# Patient Record
Sex: MALE | Race: WHITE | NOT HISPANIC OR LATINO | Employment: OTHER | URBAN - METROPOLITAN AREA
[De-identification: names, ages, dates, MRNs, and addresses within clinical notes are randomized per-mention and may not be internally consistent; named-entity substitution may affect disease eponyms.]

---

## 2020-02-06 ENCOUNTER — OFFICE VISIT (OUTPATIENT)
Dept: MEDICAL GROUP | Facility: MEDICAL CENTER | Age: 60
End: 2020-02-06
Payer: COMMERCIAL

## 2020-02-06 VITALS
WEIGHT: 248 LBS | BODY MASS INDEX: 35.5 KG/M2 | RESPIRATION RATE: 16 BRPM | SYSTOLIC BLOOD PRESSURE: 130 MMHG | TEMPERATURE: 98 F | HEIGHT: 70 IN | OXYGEN SATURATION: 94 % | DIASTOLIC BLOOD PRESSURE: 84 MMHG | HEART RATE: 77 BPM

## 2020-02-06 DIAGNOSIS — Z96.642 S/P HIP REPLACEMENT, LEFT: ICD-10-CM

## 2020-02-06 DIAGNOSIS — E66.9 OBESITY, CLASS II, BMI 35-39.9, ISOLATED (SEE ACTUAL BMI): ICD-10-CM

## 2020-02-06 DIAGNOSIS — Z12.11 SCREENING FOR COLON CANCER: ICD-10-CM

## 2020-02-06 DIAGNOSIS — Z12.5 SCREENING PSA (PROSTATE SPECIFIC ANTIGEN): ICD-10-CM

## 2020-02-06 DIAGNOSIS — Z13.220 LIPID SCREENING: ICD-10-CM

## 2020-02-06 DIAGNOSIS — R01.1 CARDIAC MURMUR: ICD-10-CM

## 2020-02-06 DIAGNOSIS — M16.11 PRIMARY OSTEOARTHRITIS OF RIGHT HIP: ICD-10-CM

## 2020-02-06 DIAGNOSIS — Z00.00 ANNUAL PHYSICAL EXAM: ICD-10-CM

## 2020-02-06 DIAGNOSIS — Z76.89 ENCOUNTER TO ESTABLISH CARE: ICD-10-CM

## 2020-02-06 PROBLEM — E66.812 OBESITY, CLASS II, BMI 35-39.9, ISOLATED (SEE ACTUAL BMI): Status: ACTIVE | Noted: 2020-02-06

## 2020-02-06 PROCEDURE — 99204 OFFICE O/P NEW MOD 45 MIN: CPT | Performed by: NURSE PRACTITIONER

## 2020-02-06 ASSESSMENT — PATIENT HEALTH QUESTIONNAIRE - PHQ9: CLINICAL INTERPRETATION OF PHQ2 SCORE: 0

## 2020-02-06 NOTE — ASSESSMENT & PLAN NOTE
Murmur noted on exam.  Patient is not aware of any history of murmur, states that he is never had any heart problems.  Reports that he is very active, frequently goes hunting and hikes up intense inclines without any difficulty.  Denies any shortness of breath or chest pain, dizziness, activity intolerance

## 2020-02-06 NOTE — ASSESSMENT & PLAN NOTE
History of left hip replacement several years ago, recovered well.  Now having more difficulty with the right hip and likely planning for replacement in the next few months, seen by Dr. Maya

## 2020-02-06 NOTE — PROGRESS NOTES
Chief Complaint   Patient presents with   • Establish Care   • Referral Needed     colonoscopy      Smith Gloria is a 59 y.o. male here to establish care, his wife Negrita is a patient of mine.  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  He has not seen primary care in many years, reports that he would like to get lab work and referral for colonoscopy.    Cardiac murmur  Murmur noted on exam.  Patient is not aware of any history of murmur, states that he is never had any heart problems.  Reports that he is very active, frequently goes hunting and hikes up intense inclines without any difficulty.  Denies any shortness of breath or chest pain, dizziness, activity intolerance    Obesity, Class II, BMI 35-39.9, isolated (see actual BMI)  Gradual weight gain over the last several years.  States that he is eating fairly healthy, typically has 2 meals a day drinks 1-2 alcoholic beverages daily.  He is very active but not doing any planned cardio exercise.  He does have family history of diabetes    Osteoarthritis of right hip  History of left hip replacement several years ago, recovered well.  Now having more difficulty with the right hip and likely planning for replacement in the next few months, seen by Dr. Maya    Current medicines (including changes today)  No current outpatient medications on file.     No current facility-administered medications for this visit.      He  has no past medical history on file.  He  has a past surgical history that includes hip replacement, total (2006) and cataract extraction with iol (Bilateral).  Social History     Tobacco Use   • Smoking status: Never Smoker   Substance Use Topics   • Alcohol use: Yes     Alcohol/week: 3.0 oz     Types: 6 Cans of beer per week   • Drug use: No     Social History     Patient does not qualify to have social determinant information on file (likely too young).   Social History Narrative   • Not on file     Family History   Problem Relation Age  "of Onset   • Stroke Mother         65 y.o.   • Cancer Mother         Breast   • Diabetes Father    • Heart Disease Father 81        stents x 6   • Psychiatric Illness Brother    • Alcohol abuse Brother    • Drug abuse Brother      Family Status   Relation Name Status   • Mo  Alive   • Fa  Alive   • Sis  Alive   • Bro  Alive   • MGMo     • MGFa     • PGMo     • PGFa     • Bro  Alive   • Bro  Alive   • Bro           ROS  Problems listed discussed above, all other systems reviewed and negative     Objective:     /84 (BP Location: Left arm, Patient Position: Sitting, BP Cuff Size: Adult)   Pulse 77   Temp 36.7 °C (98 °F) (Temporal)   Resp 16   Ht 1.778 m (5' 10\")   Wt 112.5 kg (248 lb)   SpO2 94%  Body mass index is 35.58 kg/m².  Physical Exam:  General: Alert, oriented in no acute distress.  Eye contact is good, speech is normal, affect calm  HEENT: perrl, Oral mucosa pink moist, no lesions. Nares patent. TMs gray with good landmarks bilaterally. No cervical or supraclavicular lymphadenopathy  Lungs: clear to auscultation bilaterally, good aeration, normal effort. No wheeze/ rhonchi/ rales.  CV: regular rate and rhythm, S1, S2.  3 out of 6 systolic murmur heard best at the right sternal border, no edema.   Abdomen: soft, nontender, BS x4, central obesity.  Ext: color normal, vascularity normal, temperature normal. No rash or lesions.  Neuro: DTR 2+ bilaterally   Assessment and Plan:   The following treatment plan was discussed   1. Annual physical exam  Normal physical exam except as discussed below. General health and wellness discussion including healthy diet, regular exercise. Advised regular dental cleanings, eye exam yearly.    Lipid Profile    Comp Metabolic Panel   2. Primary osteoarthritis of right hip  Planning for right hip replacement   3. S/P hip replacement, left  Recovered well   4. Obesity, Class II, BMI 35-39.9, isolated (see actual BMI)  Patient " identified as having weight management issue.  Appropriate orders and counseling given.   5. Cardiac murmur  Pt denies h/o murmur. States he is very active and has no shortness of breath or dizziness with this, no chest pain.   6. Encounter to establish care     7. Lipid screening  Lipid Profile   8. Screening PSA (prostate specific antigen)  PROSTATE SPECIFIC AG SCREENING   9. Screening for colon cancer  Referral for counseling       Educated in proper administration of medication(s) ordered today including safety, possible SE, risks, benefits, rationale and alternatives to therapy.     Followup: pending labs             Please note that this dictation was created using voice recognition software. I have worked with consultants from the vendor as well as technical experts from [facility] [employer] to optimize the interface. I have made every reasonable attempt to correct obvious errors, but I expect that there are errors of grammar and possibly content that I did not discover before finalizing the note.

## 2020-02-06 NOTE — ASSESSMENT & PLAN NOTE
Gradual weight gain over the last several years.  States that he is eating fairly healthy, typically has 2 meals a day drinks 1-2 alcoholic beverages daily.  He is very active but not doing any planned cardio exercise.  He does have family history of diabetes

## 2020-02-29 LAB
ALBUMIN SERPL-MCNC: 4.4 G/DL (ref 3.8–4.9)
ALBUMIN/GLOB SERPL: 1.6 {RATIO} (ref 1.2–2.2)
ALP SERPL-CCNC: 81 IU/L (ref 39–117)
ALT SERPL-CCNC: 26 IU/L (ref 0–44)
AST SERPL-CCNC: 18 IU/L (ref 0–40)
BILIRUB SERPL-MCNC: 0.7 MG/DL (ref 0–1.2)
BUN SERPL-MCNC: 12 MG/DL (ref 6–24)
BUN/CREAT SERPL: 13 (ref 9–20)
CALCIUM SERPL-MCNC: 9.7 MG/DL (ref 8.7–10.2)
CHLORIDE SERPL-SCNC: 97 MMOL/L (ref 96–106)
CHOLEST SERPL-MCNC: 198 MG/DL (ref 100–199)
CO2 SERPL-SCNC: 25 MMOL/L (ref 20–29)
CREAT SERPL-MCNC: 0.91 MG/DL (ref 0.76–1.27)
GLOBULIN SER CALC-MCNC: 2.8 G/DL (ref 1.5–4.5)
GLUCOSE SERPL-MCNC: 108 MG/DL (ref 65–99)
HDLC SERPL-MCNC: 57 MG/DL
LABORATORY COMMENT REPORT: ABNORMAL
LDLC SERPL CALC-MCNC: 124 MG/DL (ref 0–99)
POTASSIUM SERPL-SCNC: 4.4 MMOL/L (ref 3.5–5.2)
PROT SERPL-MCNC: 7.2 G/DL (ref 6–8.5)
PSA SERPL-MCNC: 0.8 NG/ML (ref 0–4)
SODIUM SERPL-SCNC: 139 MMOL/L (ref 134–144)
TRIGL SERPL-MCNC: 86 MG/DL (ref 0–149)
VLDLC SERPL CALC-MCNC: 17 MG/DL (ref 5–40)

## 2020-03-11 ENCOUNTER — OFFICE VISIT (OUTPATIENT)
Dept: MEDICAL GROUP | Facility: MEDICAL CENTER | Age: 60
End: 2020-03-11
Payer: COMMERCIAL

## 2020-03-11 VITALS
WEIGHT: 239 LBS | SYSTOLIC BLOOD PRESSURE: 138 MMHG | OXYGEN SATURATION: 96 % | DIASTOLIC BLOOD PRESSURE: 100 MMHG | TEMPERATURE: 97.2 F | HEART RATE: 76 BPM | BODY MASS INDEX: 34.22 KG/M2 | RESPIRATION RATE: 16 BRPM | HEIGHT: 70 IN

## 2020-03-11 DIAGNOSIS — Z11.59 NEED FOR HEPATITIS C SCREENING TEST: ICD-10-CM

## 2020-03-11 DIAGNOSIS — B35.1 ONYCHOMYCOSIS: ICD-10-CM

## 2020-03-11 PROCEDURE — 99213 OFFICE O/P EST LOW 20 MIN: CPT | Performed by: NURSE PRACTITIONER

## 2020-03-11 RX ORDER — TERBINAFINE HYDROCHLORIDE 250 MG/1
250 TABLET ORAL DAILY
Qty: 30 TAB | Refills: 3 | Status: SHIPPED | OUTPATIENT
Start: 2020-03-11 | End: 2020-08-27

## 2020-03-11 NOTE — ASSESSMENT & PLAN NOTE
Patient reports longstanding difficulty with fungal nail infection affecting bilateral large toes, ongoing for more than 15 years.  States that he is tried multiple homeopathic and over-the-counter treatment options without improvement, he would like to try oral medication.  He has had recent labs which showed normal liver function.  He drinks alcohol only on occasion, typically 1 drink at a time.  He does not have any skin involvement, erythema, associated pain

## 2020-03-11 NOTE — PROGRESS NOTES
"Subjective:     Chief Complaint   Patient presents with   • Nail Problem     Smith Gloria is a 59 y.o. male here today to follow up on:    Onychomycosis  Patient reports longstanding difficulty with fungal nail infection affecting bilateral large toes, ongoing for more than 15 years.  States that he is tried multiple homeopathic and over-the-counter treatment options without improvement, he would like to try oral medication.  He has had recent labs which showed normal liver function.  He drinks alcohol only on occasion, typically 1 drink at a time.  He does not have any skin involvement, erythema, associated pain       Current medicines (including changes today)  Current Outpatient Medications   Medication Sig Dispense Refill   • terbinafine (LAMISIL) 250 MG Tab Take 1 Tab by mouth every day. 30 Tab 3     No current facility-administered medications for this visit.      He  has no past medical history on file.    ROS included above     Objective:     /100 (BP Location: Left arm, Patient Position: Sitting, BP Cuff Size: Adult)   Pulse 76   Temp 36.2 °C (97.2 °F) (Temporal)   Resp 16   Ht 1.778 m (5' 10\")   Wt 108.4 kg (239 lb)   SpO2 96%  Body mass index is 34.29 kg/m².     Physical Exam:  General: Alert, oriented in no acute distress.  Eye contact is good, speech is normal, affect calm  Lungs: clear to auscultation bilaterally, normal effort, no wheeze/ rhonchi/ rales.  CV: regular rate and rhythm, S1, S2, no murmur  Ext: Bilateral large toenail thickened and yellowed, other toenails normal.  No skin erythema, no rash    Assessment and Plan:   The following treatment plan was discussed  1. Onychomycosis   patient is failed multiple over-the-counter and homeopathic treatment options, requesting oral medication.  Risks and side effects reviewed, recent labs normal.  Plan for repeat labs in 1 month  terbinafine (LAMISIL) 250 MG Tab    HEPATIC FUNCTION PANEL   2. Need for hepatitis C screening test  HEP C " VIRUS ANTIBODY       Followup:  Labs in 1 month         Please note that this dictation was created using voice recognition software. I have worked with consultants from the vendor as well as technical experts from [facility] to optimize the interface. I have made every reasonable attempt to correct obvious errors, but I expect that there are errors of grammar and possibly content that I did not discover before finalizing the note.

## 2020-07-07 DIAGNOSIS — Z01.812 PRE-OPERATIVE LABORATORY EXAMINATION: ICD-10-CM

## 2020-07-07 DIAGNOSIS — Z01.810 PRE-OPERATIVE CARDIOVASCULAR EXAMINATION: ICD-10-CM

## 2020-07-07 LAB
ANION GAP SERPL CALC-SCNC: 11 MMOL/L (ref 7–16)
BUN SERPL-MCNC: 19 MG/DL (ref 8–22)
CALCIUM SERPL-MCNC: 9.7 MG/DL (ref 8.4–10.2)
CHLORIDE SERPL-SCNC: 103 MMOL/L (ref 96–112)
CO2 SERPL-SCNC: 23 MMOL/L (ref 20–33)
CREAT SERPL-MCNC: 0.8 MG/DL (ref 0.5–1.4)
EKG IMPRESSION: NORMAL
ERYTHROCYTE [DISTWIDTH] IN BLOOD BY AUTOMATED COUNT: 41.1 FL (ref 35.9–50)
GLUCOSE SERPL-MCNC: 105 MG/DL (ref 65–99)
HCT VFR BLD AUTO: 47.5 % (ref 42–52)
HGB BLD-MCNC: 16.1 G/DL (ref 14–18)
MCH RBC QN AUTO: 30.3 PG (ref 27–33)
MCHC RBC AUTO-ENTMCNC: 33.9 G/DL (ref 33.7–35.3)
MCV RBC AUTO: 89.5 FL (ref 81.4–97.8)
PLATELET # BLD AUTO: 252 K/UL (ref 164–446)
PMV BLD AUTO: 10.6 FL (ref 9–12.9)
POTASSIUM SERPL-SCNC: 4.5 MMOL/L (ref 3.6–5.5)
RBC # BLD AUTO: 5.31 M/UL (ref 4.7–6.1)
SODIUM SERPL-SCNC: 137 MMOL/L (ref 135–145)
WBC # BLD AUTO: 7 K/UL (ref 4.8–10.8)

## 2020-07-07 PROCEDURE — 87640 STAPH A DNA AMP PROBE: CPT

## 2020-07-07 PROCEDURE — 85027 COMPLETE CBC AUTOMATED: CPT

## 2020-07-07 PROCEDURE — 93010 ELECTROCARDIOGRAM REPORT: CPT | Performed by: INTERNAL MEDICINE

## 2020-07-07 PROCEDURE — 80048 BASIC METABOLIC PNL TOTAL CA: CPT

## 2020-07-07 PROCEDURE — 93005 ELECTROCARDIOGRAM TRACING: CPT

## 2020-07-07 PROCEDURE — 87641 MR-STAPH DNA AMP PROBE: CPT

## 2020-07-07 PROCEDURE — 36415 COLL VENOUS BLD VENIPUNCTURE: CPT

## 2020-07-07 RX ORDER — IBUPROFEN 200 MG
800 TABLET ORAL EVERY 6 HOURS PRN
COMMUNITY

## 2020-07-07 NOTE — OR NURSING
"Patient enrolled in Bloodless Program, Physician notified of bloodless enrollment, bloodless consent signed, Advanced directive, durable power of  or social consult placed.  \"BLOODLESS\" entered into allergy section, patient had \"NO BLOOD\" armband and stickers attached to chart in preadmit  "

## 2020-07-07 NOTE — DISCHARGE PLANNING
DISCHARGE PLANNING NOTE - TOTAL JOINT     Procedure: Procedure(s):  ARTHROPLASTY, HIP, TOTAL - AND SALDANA  Procedure Date: 7/13/2020  Insurance:  Payor: SAROJR / Plan: AMBETTER / Product Type: *No Product type* /   Equipment currently available at home? crutches  Steps into the home? 1  Steps within the home? 0  Toilet height? Standard  Type of shower? tub-shower  Who will be with you during your recovery? Wife.  Is Outpatient Physical Therapy set up after surgery? Yes  Did you take the Total Joint Class and where? No and pt refusing any educational material due to previous surgery.  Planning on same day discharge? No.     This writer met with pt during his preadmission appointment. Pt declined any educational material due to previous surgery. Pt states he will not need anything except for his crutches. Home safety checklist reviewed and pt did take a copy. No questions asked by pt. Anticipate dc to home without barriers.

## 2020-07-07 NOTE — OR NURSING
"Preadmit appointment: \" Preparing for your Procedure information\" sheet given to patient with verbal and written instructions. Patient instructed to continue prescribed medications through the day before surgery, instructed to take the following medications the day of surgery per anesthesia protocol: none.  Pt denies issues with anesthesia,  Pt denies burning, urgency, frequency or pain with urination. Pt to have covid testing done thurs. 7/9 and is aware to self isolate and given symptoms of covid and to notify MD if any symptoms develop.   "

## 2020-07-08 ENCOUNTER — APPOINTMENT (OUTPATIENT)
Dept: ADMISSIONS | Facility: MEDICAL CENTER | Age: 60
End: 2020-07-08
Attending: ORTHOPAEDIC SURGERY
Payer: COMMERCIAL

## 2020-07-08 LAB
SCCMEC + MECA PNL NOSE NAA+PROBE: NEGATIVE
SCCMEC + MECA PNL NOSE NAA+PROBE: NEGATIVE

## 2020-07-09 ENCOUNTER — OFFICE VISIT (OUTPATIENT)
Dept: ADMISSIONS | Facility: MEDICAL CENTER | Age: 60
End: 2020-07-09
Attending: ORTHOPAEDIC SURGERY
Payer: COMMERCIAL

## 2020-07-09 DIAGNOSIS — Z01.812 PRE-OPERATIVE LABORATORY EXAMINATION: ICD-10-CM

## 2020-07-09 LAB — COVID ORDER STATUS COVID19: NORMAL

## 2020-07-09 PROCEDURE — U0003 INFECTIOUS AGENT DETECTION BY NUCLEIC ACID (DNA OR RNA); SEVERE ACUTE RESPIRATORY SYNDROME CORONAVIRUS 2 (SARS-COV-2) (CORONAVIRUS DISEASE [COVID-19]), AMPLIFIED PROBE TECHNIQUE, MAKING USE OF HIGH THROUGHPUT TECHNOLOGIES AS DESCRIBED BY CMS-2020-01-R: HCPCS

## 2020-07-10 LAB
SARS-COV-2 RNA RESP QL NAA+PROBE: NOTDETECTED
SPECIMEN SOURCE: NORMAL

## 2020-07-13 ENCOUNTER — APPOINTMENT (OUTPATIENT)
Dept: RADIOLOGY | Facility: MEDICAL CENTER | Age: 60
End: 2020-07-13
Attending: ORTHOPAEDIC SURGERY
Payer: COMMERCIAL

## 2020-07-13 ENCOUNTER — HOSPITAL ENCOUNTER (OUTPATIENT)
Facility: MEDICAL CENTER | Age: 60
End: 2020-07-13
Attending: ORTHOPAEDIC SURGERY | Admitting: ORTHOPAEDIC SURGERY
Payer: COMMERCIAL

## 2020-07-13 ENCOUNTER — ANESTHESIA (OUTPATIENT)
Dept: SURGERY | Facility: MEDICAL CENTER | Age: 60
End: 2020-07-13
Payer: COMMERCIAL

## 2020-07-13 ENCOUNTER — ANESTHESIA EVENT (OUTPATIENT)
Dept: SURGERY | Facility: MEDICAL CENTER | Age: 60
End: 2020-07-13
Payer: COMMERCIAL

## 2020-07-13 PROCEDURE — 72170 X-RAY EXAM OF PELVIS: CPT

## 2020-07-13 PROCEDURE — 160036 HCHG PACU - EA ADDL 30 MINS PHASE I: Performed by: ORTHOPAEDIC SURGERY

## 2020-07-13 PROCEDURE — 160009 HCHG ANES TIME/MIN: Performed by: ORTHOPAEDIC SURGERY

## 2020-07-13 PROCEDURE — 502578 HCHG PACK, TOTAL HIP: Performed by: ORTHOPAEDIC SURGERY

## 2020-07-13 PROCEDURE — 500864 HCHG NEEDLE, SPINAL 18G: Performed by: ORTHOPAEDIC SURGERY

## 2020-07-13 PROCEDURE — 160048 HCHG OR STATISTICAL LEVEL 1-5: Performed by: ORTHOPAEDIC SURGERY

## 2020-07-13 PROCEDURE — C1713 ANCHOR/SCREW BN/BN,TIS/BN: HCPCS | Performed by: ORTHOPAEDIC SURGERY

## 2020-07-13 PROCEDURE — 97535 SELF CARE MNGMENT TRAINING: CPT

## 2020-07-13 PROCEDURE — 700101 HCHG RX REV CODE 250: Performed by: ORTHOPAEDIC SURGERY

## 2020-07-13 PROCEDURE — 700111 HCHG RX REV CODE 636 W/ 250 OVERRIDE (IP): Performed by: ORTHOPAEDIC SURGERY

## 2020-07-13 PROCEDURE — 700102 HCHG RX REV CODE 250 W/ 637 OVERRIDE(OP): Performed by: STUDENT IN AN ORGANIZED HEALTH CARE EDUCATION/TRAINING PROGRAM

## 2020-07-13 PROCEDURE — A9270 NON-COVERED ITEM OR SERVICE: HCPCS | Performed by: STUDENT IN AN ORGANIZED HEALTH CARE EDUCATION/TRAINING PROGRAM

## 2020-07-13 PROCEDURE — 160031 HCHG SURGERY MINUTES - 1ST 30 MINS LEVEL 5: Performed by: ORTHOPAEDIC SURGERY

## 2020-07-13 PROCEDURE — A9270 NON-COVERED ITEM OR SERVICE: HCPCS | Performed by: ORTHOPAEDIC SURGERY

## 2020-07-13 PROCEDURE — 160042 HCHG SURGERY MINUTES - EA ADDL 1 MIN LEVEL 5: Performed by: ORTHOPAEDIC SURGERY

## 2020-07-13 PROCEDURE — 160002 HCHG RECOVERY MINUTES (STAT): Performed by: ORTHOPAEDIC SURGERY

## 2020-07-13 PROCEDURE — G0378 HOSPITAL OBSERVATION PER HR: HCPCS

## 2020-07-13 PROCEDURE — 502000 HCHG MISC OR IMPLANTS RC 0278: Performed by: ORTHOPAEDIC SURGERY

## 2020-07-13 PROCEDURE — A6222 GAUZE <=16 IN NO W/SAL W/O B: HCPCS | Performed by: ORTHOPAEDIC SURGERY

## 2020-07-13 PROCEDURE — 700111 HCHG RX REV CODE 636 W/ 250 OVERRIDE (IP): Performed by: STUDENT IN AN ORGANIZED HEALTH CARE EDUCATION/TRAINING PROGRAM

## 2020-07-13 PROCEDURE — 700105 HCHG RX REV CODE 258: Performed by: ORTHOPAEDIC SURGERY

## 2020-07-13 PROCEDURE — 700101 HCHG RX REV CODE 250: Performed by: STUDENT IN AN ORGANIZED HEALTH CARE EDUCATION/TRAINING PROGRAM

## 2020-07-13 PROCEDURE — 501445 HCHG STAPLER, SKIN DISP: Performed by: ORTHOPAEDIC SURGERY

## 2020-07-13 PROCEDURE — 96365 THER/PROPH/DIAG IV INF INIT: CPT | Mod: XU

## 2020-07-13 PROCEDURE — 160035 HCHG PACU - 1ST 60 MINS PHASE I: Performed by: ORTHOPAEDIC SURGERY

## 2020-07-13 PROCEDURE — 500562 HCHG FIBERWIRE: Performed by: ORTHOPAEDIC SURGERY

## 2020-07-13 PROCEDURE — 96375 TX/PRO/DX INJ NEW DRUG ADDON: CPT | Mod: XU

## 2020-07-13 PROCEDURE — 700102 HCHG RX REV CODE 250 W/ 637 OVERRIDE(OP): Performed by: ORTHOPAEDIC SURGERY

## 2020-07-13 PROCEDURE — 500447 HCHG DRESSING, TEGADERM 8X12: Performed by: ORTHOPAEDIC SURGERY

## 2020-07-13 DEVICE — IMPLANTABLE DEVICE: Type: IMPLANTABLE DEVICE | Site: HIP | Status: FUNCTIONAL

## 2020-07-13 RX ORDER — KETOROLAC TROMETHAMINE 30 MG/ML
30 INJECTION, SOLUTION INTRAMUSCULAR; INTRAVENOUS EVERY 6 HOURS
Status: DISCONTINUED | OUTPATIENT
Start: 2020-07-13 | End: 2020-07-14 | Stop reason: HOSPADM

## 2020-07-13 RX ORDER — OXYCODONE HCL 5 MG/5 ML
10 SOLUTION, ORAL ORAL
Status: DISCONTINUED | OUTPATIENT
Start: 2020-07-13 | End: 2020-07-13 | Stop reason: HOSPADM

## 2020-07-13 RX ORDER — HYDROMORPHONE HYDROCHLORIDE 1 MG/ML
0.2 INJECTION, SOLUTION INTRAMUSCULAR; INTRAVENOUS; SUBCUTANEOUS
Status: DISCONTINUED | OUTPATIENT
Start: 2020-07-13 | End: 2020-07-13 | Stop reason: HOSPADM

## 2020-07-13 RX ORDER — DIPHENHYDRAMINE HYDROCHLORIDE 50 MG/ML
12.5 INJECTION INTRAMUSCULAR; INTRAVENOUS
Status: DISCONTINUED | OUTPATIENT
Start: 2020-07-13 | End: 2020-07-13 | Stop reason: HOSPADM

## 2020-07-13 RX ORDER — ROCURONIUM BROMIDE 10 MG/ML
INJECTION, SOLUTION INTRAVENOUS PRN
Status: DISCONTINUED | OUTPATIENT
Start: 2020-07-13 | End: 2020-07-13 | Stop reason: SURG

## 2020-07-13 RX ORDER — MAGNESIUM SULFATE HEPTAHYDRATE 500 MG/ML
INJECTION, SOLUTION INTRAMUSCULAR; INTRAVENOUS PRN
Status: DISCONTINUED | OUTPATIENT
Start: 2020-07-13 | End: 2020-07-13 | Stop reason: SURG

## 2020-07-13 RX ORDER — DEXAMETHASONE SODIUM PHOSPHATE 4 MG/ML
4 INJECTION, SOLUTION INTRA-ARTICULAR; INTRALESIONAL; INTRAMUSCULAR; INTRAVENOUS; SOFT TISSUE
Status: DISCONTINUED | OUTPATIENT
Start: 2020-07-13 | End: 2020-07-14 | Stop reason: HOSPADM

## 2020-07-13 RX ORDER — SCOLOPAMINE TRANSDERMAL SYSTEM 1 MG/1
1 PATCH, EXTENDED RELEASE TRANSDERMAL
Status: DISCONTINUED | OUTPATIENT
Start: 2020-07-13 | End: 2020-07-14 | Stop reason: HOSPADM

## 2020-07-13 RX ORDER — DEXAMETHASONE SODIUM PHOSPHATE 4 MG/ML
INJECTION, SOLUTION INTRA-ARTICULAR; INTRALESIONAL; INTRAMUSCULAR; INTRAVENOUS; SOFT TISSUE PRN
Status: DISCONTINUED | OUTPATIENT
Start: 2020-07-13 | End: 2020-07-13 | Stop reason: SURG

## 2020-07-13 RX ORDER — CEFAZOLIN SODIUM 1 G/3ML
INJECTION, POWDER, FOR SOLUTION INTRAMUSCULAR; INTRAVENOUS PRN
Status: DISCONTINUED | OUTPATIENT
Start: 2020-07-13 | End: 2020-07-13 | Stop reason: SURG

## 2020-07-13 RX ORDER — DOCUSATE SODIUM 100 MG/1
100 CAPSULE, LIQUID FILLED ORAL 2 TIMES DAILY
Status: DISCONTINUED | OUTPATIENT
Start: 2020-07-13 | End: 2020-07-14 | Stop reason: HOSPADM

## 2020-07-13 RX ORDER — ONDANSETRON 2 MG/ML
4 INJECTION INTRAMUSCULAR; INTRAVENOUS
Status: DISCONTINUED | OUTPATIENT
Start: 2020-07-13 | End: 2020-07-13 | Stop reason: HOSPADM

## 2020-07-13 RX ORDER — OXYCODONE HCL 5 MG/5 ML
5 SOLUTION, ORAL ORAL
Status: DISCONTINUED | OUTPATIENT
Start: 2020-07-13 | End: 2020-07-13 | Stop reason: HOSPADM

## 2020-07-13 RX ORDER — POLYETHYLENE GLYCOL 3350 17 G/17G
1 POWDER, FOR SOLUTION ORAL 2 TIMES DAILY PRN
Status: DISCONTINUED | OUTPATIENT
Start: 2020-07-13 | End: 2020-07-14 | Stop reason: HOSPADM

## 2020-07-13 RX ORDER — BISACODYL 10 MG
10 SUPPOSITORY, RECTAL RECTAL
Status: DISCONTINUED | OUTPATIENT
Start: 2020-07-13 | End: 2020-07-14 | Stop reason: HOSPADM

## 2020-07-13 RX ORDER — KETOROLAC TROMETHAMINE 30 MG/ML
INJECTION, SOLUTION INTRAMUSCULAR; INTRAVENOUS
Status: DISCONTINUED | OUTPATIENT
Start: 2020-07-13 | End: 2020-07-13 | Stop reason: HOSPADM

## 2020-07-13 RX ORDER — LIDOCAINE HYDROCHLORIDE 20 MG/ML
INJECTION, SOLUTION EPIDURAL; INFILTRATION; INTRACAUDAL; PERINEURAL PRN
Status: DISCONTINUED | OUTPATIENT
Start: 2020-07-13 | End: 2020-07-13 | Stop reason: SURG

## 2020-07-13 RX ORDER — MORPHINE SULFATE 4 MG/ML
4 INJECTION, SOLUTION INTRAMUSCULAR; INTRAVENOUS
Status: DISCONTINUED | OUTPATIENT
Start: 2020-07-13 | End: 2020-07-14 | Stop reason: HOSPADM

## 2020-07-13 RX ORDER — SODIUM CHLORIDE, SODIUM LACTATE, POTASSIUM CHLORIDE, CALCIUM CHLORIDE 600; 310; 30; 20 MG/100ML; MG/100ML; MG/100ML; MG/100ML
INJECTION, SOLUTION INTRAVENOUS CONTINUOUS
Status: DISCONTINUED | OUTPATIENT
Start: 2020-07-13 | End: 2020-07-13 | Stop reason: HOSPADM

## 2020-07-13 RX ORDER — HYDROMORPHONE HYDROCHLORIDE 2 MG/ML
INJECTION, SOLUTION INTRAMUSCULAR; INTRAVENOUS; SUBCUTANEOUS PRN
Status: DISCONTINUED | OUTPATIENT
Start: 2020-07-13 | End: 2020-07-13 | Stop reason: SURG

## 2020-07-13 RX ORDER — DEXAMETHASONE SODIUM PHOSPHATE 4 MG/ML
10 INJECTION, SOLUTION INTRA-ARTICULAR; INTRALESIONAL; INTRAMUSCULAR; INTRAVENOUS; SOFT TISSUE ONCE
Status: DISCONTINUED | OUTPATIENT
Start: 2020-07-14 | End: 2020-07-14 | Stop reason: HOSPADM

## 2020-07-13 RX ORDER — ACETAMINOPHEN 500 MG
1000 TABLET ORAL EVERY 6 HOURS
Status: DISCONTINUED | OUTPATIENT
Start: 2020-07-13 | End: 2020-07-14 | Stop reason: HOSPADM

## 2020-07-13 RX ORDER — ACETAMINOPHEN 500 MG
1000 TABLET ORAL ONCE
Status: COMPLETED | OUTPATIENT
Start: 2020-07-13 | End: 2020-07-13

## 2020-07-13 RX ORDER — MEPERIDINE HYDROCHLORIDE 25 MG/ML
12.5 INJECTION INTRAMUSCULAR; INTRAVENOUS; SUBCUTANEOUS
Status: DISCONTINUED | OUTPATIENT
Start: 2020-07-13 | End: 2020-07-13 | Stop reason: HOSPADM

## 2020-07-13 RX ORDER — HYDROMORPHONE HYDROCHLORIDE 1 MG/ML
0.4 INJECTION, SOLUTION INTRAMUSCULAR; INTRAVENOUS; SUBCUTANEOUS
Status: DISCONTINUED | OUTPATIENT
Start: 2020-07-13 | End: 2020-07-13 | Stop reason: HOSPADM

## 2020-07-13 RX ORDER — HYDROMORPHONE HYDROCHLORIDE 1 MG/ML
0.1 INJECTION, SOLUTION INTRAMUSCULAR; INTRAVENOUS; SUBCUTANEOUS
Status: DISCONTINUED | OUTPATIENT
Start: 2020-07-13 | End: 2020-07-13 | Stop reason: HOSPADM

## 2020-07-13 RX ORDER — SODIUM CHLORIDE 9 MG/ML
INJECTION, SOLUTION INTRAMUSCULAR; INTRAVENOUS; SUBCUTANEOUS
Status: DISCONTINUED | OUTPATIENT
Start: 2020-07-13 | End: 2020-07-13 | Stop reason: HOSPADM

## 2020-07-13 RX ORDER — ENEMA 19; 7 G/133ML; G/133ML
1 ENEMA RECTAL
Status: DISCONTINUED | OUTPATIENT
Start: 2020-07-13 | End: 2020-07-14 | Stop reason: HOSPADM

## 2020-07-13 RX ORDER — ROPIVACAINE HYDROCHLORIDE 5 MG/ML
INJECTION, SOLUTION EPIDURAL; INFILTRATION; PERINEURAL
Status: DISCONTINUED | OUTPATIENT
Start: 2020-07-13 | End: 2020-07-13 | Stop reason: HOSPADM

## 2020-07-13 RX ORDER — OXYCODONE HYDROCHLORIDE 5 MG/1
5 TABLET ORAL
Status: DISCONTINUED | OUTPATIENT
Start: 2020-07-13 | End: 2020-07-14 | Stop reason: HOSPADM

## 2020-07-13 RX ORDER — ONDANSETRON 2 MG/ML
4 INJECTION INTRAMUSCULAR; INTRAVENOUS EVERY 4 HOURS PRN
Status: DISCONTINUED | OUTPATIENT
Start: 2020-07-13 | End: 2020-07-14 | Stop reason: HOSPADM

## 2020-07-13 RX ORDER — OXYCODONE HYDROCHLORIDE 10 MG/1
10 TABLET ORAL
Status: DISCONTINUED | OUTPATIENT
Start: 2020-07-13 | End: 2020-07-14 | Stop reason: HOSPADM

## 2020-07-13 RX ORDER — AMOXICILLIN 250 MG
1 CAPSULE ORAL
Status: DISCONTINUED | OUTPATIENT
Start: 2020-07-13 | End: 2020-07-14 | Stop reason: HOSPADM

## 2020-07-13 RX ORDER — HALOPERIDOL 5 MG/ML
1 INJECTION INTRAMUSCULAR
Status: DISCONTINUED | OUTPATIENT
Start: 2020-07-13 | End: 2020-07-13 | Stop reason: HOSPADM

## 2020-07-13 RX ORDER — MORPHINE SULFATE 4 MG/ML
INJECTION, SOLUTION INTRAMUSCULAR; INTRAVENOUS
Status: DISCONTINUED | OUTPATIENT
Start: 2020-07-13 | End: 2020-07-13 | Stop reason: HOSPADM

## 2020-07-13 RX ORDER — DIPHENHYDRAMINE HYDROCHLORIDE 50 MG/ML
25 INJECTION INTRAMUSCULAR; INTRAVENOUS EVERY 6 HOURS PRN
Status: DISCONTINUED | OUTPATIENT
Start: 2020-07-13 | End: 2020-07-14 | Stop reason: HOSPADM

## 2020-07-13 RX ORDER — AMOXICILLIN 250 MG
1 CAPSULE ORAL NIGHTLY
Status: DISCONTINUED | OUTPATIENT
Start: 2020-07-13 | End: 2020-07-14 | Stop reason: HOSPADM

## 2020-07-13 RX ADMIN — CEFAZOLIN 2 G: 10 INJECTION, POWDER, FOR SOLUTION INTRAVENOUS; PARENTERAL at 21:56

## 2020-07-13 RX ADMIN — TRANEXAMIC ACID 2000 MG: 100 INJECTION, SOLUTION INTRAVENOUS at 15:59

## 2020-07-13 RX ADMIN — SODIUM CHLORIDE, POTASSIUM CHLORIDE, SODIUM LACTATE AND CALCIUM CHLORIDE: 600; 310; 30; 20 INJECTION, SOLUTION INTRAVENOUS at 10:47

## 2020-07-13 RX ADMIN — LIDOCAINE HYDROCHLORIDE 100 MG: 20 INJECTION, SOLUTION EPIDURAL; INFILTRATION; INTRACAUDAL; PERINEURAL at 13:35

## 2020-07-13 RX ADMIN — ACETAMINOPHEN 1000 MG: 500 TABLET, FILM COATED ORAL at 20:46

## 2020-07-13 RX ADMIN — CEFAZOLIN 2 G: 1 INJECTION, POWDER, FOR SOLUTION INTRAVENOUS at 13:41

## 2020-07-13 RX ADMIN — FENTANYL CITRATE 50 MCG: 50 INJECTION, SOLUTION INTRAMUSCULAR; INTRAVENOUS at 13:30

## 2020-07-13 RX ADMIN — ROCURONIUM BROMIDE 50 MG: 10 INJECTION, SOLUTION INTRAVENOUS at 13:35

## 2020-07-13 RX ADMIN — HYDROMORPHONE HYDROCHLORIDE 0.5 MG: 2 INJECTION, SOLUTION INTRAMUSCULAR; INTRAVENOUS; SUBCUTANEOUS at 13:30

## 2020-07-13 RX ADMIN — ACETAMINOPHEN 1000 MG: 500 TABLET, FILM COATED ORAL at 10:47

## 2020-07-13 RX ADMIN — PROPOFOL 200 MG: 10 INJECTION, EMULSION INTRAVENOUS at 13:35

## 2020-07-13 RX ADMIN — HYDROMORPHONE HYDROCHLORIDE 0.5 MG: 2 INJECTION, SOLUTION INTRAMUSCULAR; INTRAVENOUS; SUBCUTANEOUS at 13:50

## 2020-07-13 RX ADMIN — DEXAMETHASONE SODIUM PHOSPHATE 4 MG: 4 INJECTION, SOLUTION INTRAMUSCULAR; INTRAVENOUS at 13:42

## 2020-07-13 RX ADMIN — MAGNESIUM SULFATE HEPTAHYDRATE 2 G: 500 INJECTION, SOLUTION INTRAMUSCULAR; INTRAVENOUS at 13:50

## 2020-07-13 RX ADMIN — POVIDONE-IODINE 15 ML: 10 SOLUTION TOPICAL at 10:48

## 2020-07-13 RX ADMIN — FENTANYL CITRATE 50 MCG: 50 INJECTION, SOLUTION INTRAMUSCULAR; INTRAVENOUS at 15:08

## 2020-07-13 RX ADMIN — OXYCODONE HYDROCHLORIDE 5 MG: 5 TABLET ORAL at 20:47

## 2020-07-13 RX ADMIN — KETOROLAC TROMETHAMINE 30 MG: 30 INJECTION, SOLUTION INTRAMUSCULAR at 20:47

## 2020-07-13 RX ADMIN — LIDOCAINE HYDROCHLORIDE 0.5 ML: 10 INJECTION, SOLUTION INFILTRATION; PERINEURAL at 10:48

## 2020-07-13 ASSESSMENT — PATIENT HEALTH QUESTIONNAIRE - PHQ9
2. FEELING DOWN, DEPRESSED, IRRITABLE, OR HOPELESS: NOT AT ALL
1. LITTLE INTEREST OR PLEASURE IN DOING THINGS: NOT AT ALL
SUM OF ALL RESPONSES TO PHQ9 QUESTIONS 1 AND 2: 0

## 2020-07-13 ASSESSMENT — LIFESTYLE VARIABLES
EVER FELT BAD OR GUILTY ABOUT YOUR DRINKING: NO
ON A TYPICAL DAY WHEN YOU DRINK ALCOHOL HOW MANY DRINKS DO YOU HAVE: 0
CONSUMPTION TOTAL: NEGATIVE
TOTAL SCORE: 0
EVER_SMOKED: NEVER
AVERAGE NUMBER OF DAYS PER WEEK YOU HAVE A DRINK CONTAINING ALCOHOL: 0
TOTAL SCORE: 0
ALCOHOL_USE: NO
HAVE YOU EVER FELT YOU SHOULD CUT DOWN ON YOUR DRINKING: NO
TOTAL SCORE: 0
EVER HAD A DRINK FIRST THING IN THE MORNING TO STEADY YOUR NERVES TO GET RID OF A HANGOVER: NO
HAVE PEOPLE ANNOYED YOU BY CRITICIZING YOUR DRINKING: NO
HOW MANY TIMES IN THE PAST YEAR HAVE YOU HAD 5 OR MORE DRINKS IN A DAY: 0

## 2020-07-13 ASSESSMENT — COGNITIVE AND FUNCTIONAL STATUS - GENERAL
TURNING FROM BACK TO SIDE WHILE IN FLAT BAD: A LITTLE
MOBILITY SCORE: 18
STANDING UP FROM CHAIR USING ARMS: A LITTLE
WALKING IN HOSPITAL ROOM: A LITTLE
DAILY ACTIVITIY SCORE: 18
SUGGESTED CMS G CODE MODIFIER MOBILITY: CK
DRESSING REGULAR UPPER BODY CLOTHING: A LITTLE
EATING MEALS: A LITTLE
TOILETING: A LITTLE
CLIMB 3 TO 5 STEPS WITH RAILING: A LITTLE
HELP NEEDED FOR BATHING: A LITTLE
SUGGESTED CMS G CODE MODIFIER DAILY ACTIVITY: CK
PERSONAL GROOMING: A LITTLE
DRESSING REGULAR LOWER BODY CLOTHING: A LITTLE
MOVING TO AND FROM BED TO CHAIR: A LITTLE
MOVING FROM LYING ON BACK TO SITTING ON SIDE OF FLAT BED: A LITTLE

## 2020-07-13 ASSESSMENT — FIBROSIS 4 INDEX: FIB4 SCORE: 0.81

## 2020-07-13 NOTE — OP REPORT
DATE OF OPERATION: 7/13/2020      PREOPERATIVE DIAGNOSES:   1. Osteoarthritis, right hip.     POSTOPERATIVE DIAGNOSES:   1. Osteoarthritis, right hip.       PROCEDURES:   1. Right total hip arthroplasty.   2. Intraoperative block for rtelief of extensive postoperative pain.       SURGEON: Herber Maya MD   ASSISTANT: Ruben PARRA    ANESTHESIA: General, No responsible provider has been recorded for the case.      IMPLANTS: Estelita size 58 Trilogy cup with a size 10 high offset taperloc biomet stem with   a 32 mm -3 ceramic head with and a 32 mm flat cross-linked polyethylene.     WEIGHTBEARING: As tolerated.     FINDINGS:   1. Advanced chondromalacic changes of the femoral head.      PROCEDURE IN DETAIL:   The patient was seen in the preop holding area and consent reviewed. The right lower extremity was marked with patient. Patient was taken to the operating room where general anesthesia was given. The body-exhaust uniforms were utilized. Perioperative antibiotics were given The patient then was positioned in a lateral decubitus position with axillary roll and Stulberg.  All bony prominences were padded. The right lower extremity was addressed with a Hibiclens, alcohol and ChloraPrep. Then sterile draping technique was performed. Air isolation draping was undertaken. An appropriate timeout was  undertaken.        The surgery was initiated with a minimally invasive posterior approach. Skin   and subcutaneous tissue were incised. Hemostasis was obtained. The fascia   sakshi and gluteal fascia were split. Short external rotators were taken down   as a single layer. T-capsular incision was made.  The hip was dislocated.     Intraoperative periarticular and extensive mook-incisional block was undertaken. This was undertaken with a combination of Toradol ropivacaine and morphine to treat postoperative pain. This was done extensively throughout the pericapsular tissues, followed by an extensive block in the  peritrochanteric tissues, followed by an extensive tissue infusion into the subcutaneous tissues. This obtained a significant intraoperative improvement in anesthesia requirements as well as pain management and vitals stabilization.    Following this, the acetabulum was addressed with Labral excision and serial reaming.  Next, the acetabulum was reverse reamed with bone graft  and the Trilogy cup inserted. The polyethylene was inserted after pulse lavage and the locking mechanism checked.     At this point, the femur was addressed.  Cookie cutter was used.  Then serial reaming   and broaching was undertaken.  Trial reductions were undertaken with reproduction and stabilization in the patient's length and offset. The final stem was then inserted.  A trial reductions repeated and  neck length and head chosen.  After pulse lavage,  cleaning and drying the Moy taper the head was inserted and the hip was relocated, full extension, external rotation stability as well as sleeping stability to 70 degrees and flexed 90 stability to 50 degrees was noted. Pulse lavage was repeated. T-capsular closure was done with #2 FiberWire. Short external rotator repair over bone bridges with #2 FiberWire.    The fascia sakshi repaired with #1 Quill barbed suture  The  subcutaneous 0 and 2-0 Monocryl and then closure of the skin. Patient was placed into a sterile bandage. The patient was  awoken from anesthetic and taken to the recovery room, tolerated the procedure very well with no signs of complications.

## 2020-07-13 NOTE — OR NURSING
1516: To PACU from OR via bed, respirations spontaneous and non-labored.  Icepack applied over c/d/i right hip surgical dressings.  No c/o pain or nausea.  VSS.    1532:  Pt /96,  Turner aware.  BP at baseline from pre op.     1600:  Xray here, pt tolerating water, refuses juice at this time.    1630:  Pt without c/o pain or nausea, VSS.    1635:  Meets criteria to transfer to floor.

## 2020-07-13 NOTE — ANESTHESIA PROCEDURE NOTES
Airway    Date/Time: 7/13/2020 1:37 PM  Performed by: Antonio Tompkins M.D.  Authorized by: Antonio Tompkins M.D.     Location:  OR  Urgency:  Elective  Indications for Airway Management:  Anesthesia      Spontaneous Ventilation: absent    Sedation Level:  Deep  Preoxygenated: Yes    Patient Position:  Sniffing  Final Airway Type:  Endotracheal airway  Final Endotracheal Airway:  ETT  Cuffed: Yes    Technique Used for Successful ETT Placement:  Direct laryngoscopy    Insertion Site:  Oral  Blade Type:  Coleman  Laryngoscope Blade/Videolaryngoscope Blade Size:  4  ETT Size (mm):  7.5  Measured from:  Teeth  ETT to Teeth (cm):  25  Placement Verified by: auscultation and capnometry    Cormack-Lehane Classification:  Grade IIa - partial view of glottis  Number of Attempts at Approach:  1

## 2020-07-13 NOTE — OR NURSING
Pt allergies and NPO status verified, home meds reconcilled. Belongings secured. Pt verbalizes understanding of the pain scale, expected course of stay, and plan of care.  Surgical site verified with pt.  IV access established.  Vance hose/Sequentials placed on L leg.

## 2020-07-13 NOTE — ANESTHESIA TIME REPORT
Anesthesia Start and Stop Event Times     Date Time Event    7/13/2020 1329 Ready for Procedure     1329 Anesthesia Start     1519 Anesthesia Stop        Responsible Staff  07/13/20    Name Role Begin End    Antonio Tompkins M.D. Anesth 1329 1519        Preop Diagnosis (Free Text):  Pre-op Diagnosis     OA OF RIGHT HIP        Preop Diagnosis (Codes):    Post op Diagnosis  OA (osteoarthritis) of hip      Premium Reason  A. 3PM - 7AM    Comments:

## 2020-07-13 NOTE — ANESTHESIA PREPROCEDURE EVALUATION
60 yo , BMI 35      Relevant Problems   CARDIAC   (+) Cardiac murmur       Physical Exam    Airway   Mallampati: II  TM distance: >3 FB  Neck ROM: full       Cardiovascular - normal exam  Rhythm: regular  Rate: normal  (-) murmur     Dental - normal exam           Pulmonary - normal exam  Breath sounds clear to auscultation     Abdominal    Neurological - normal exam                 Anesthesia Plan    ASA 2       Plan - general       Airway plan will be ETT        Induction: intravenous    Postoperative Plan: Postoperative administration of opioids is intended.    Pertinent diagnostic labs and testing reviewed    Informed Consent:    Anesthetic plan and risks discussed with patient.    Use of blood products discussed with: patient whom consented to blood products.

## 2020-07-14 VITALS
DIASTOLIC BLOOD PRESSURE: 74 MMHG | RESPIRATION RATE: 18 BRPM | HEIGHT: 70 IN | OXYGEN SATURATION: 92 % | SYSTOLIC BLOOD PRESSURE: 133 MMHG | HEART RATE: 70 BPM | WEIGHT: 245.59 LBS | BODY MASS INDEX: 35.16 KG/M2 | TEMPERATURE: 97.9 F

## 2020-07-14 NOTE — DISCHARGE INSTRUCTIONS
Discharge Instructions    Discharged to home by car with relative. Discharged via wheelchair, hospital escort: Yes.  Special equipment needed: Not Applicable    Be sure to schedule a follow-up appointment with your primary care doctor or any specialists as instructed.     Discharge Plan:        I understand that a diet low in cholesterol, fat, and sodium is recommended for good health. Unless I have been given specific instructions below for another diet, I accept this instruction as my diet prescription.   Other diet: Regular    Special Instructions: Discharge instructions for the Orthopedic Patient    Follow up with Primary Care Physician within 2 weeks of discharge to home, regarding:  Review of medications and diagnostic testing.  Surveillance for medical complications.  Workup and treatment of osteoporosis, if appropriate.     -Is this a Hip/Knee/Shoulder Joint Replacement patient? Yes   TOTAL HIP REPLACEMENT, AFTER-CARE GUIDELINES     These instructions provide you with information on caring for yourself and your hip after surgery. Your health care provider may also give you instructions that are more specific. Your treatment was planned and performed according to current medical practices but problems sometimes occur. Call your health care provider if you have any problems or questions.     WHAT TO EXPECT AFTER THE PROCEDURE   After your procedure, your hip will typically be stiff, sore, and bruised. This will improve over time.     Pain   · Follow your home pain management plan as discussed with your nurse and as directed by your provider.   · It is important to follow any scheduled pain medications for maximal pain relief.   · If prescribed opioid medication, the goal is to use opioids only as needed and to wean off prescription pain medicine as soon as possible.   · Ice use for pain control.  · Put ice in a plastic bag.   · Place a towel between your skin and the bag.   · Leave the ice on for 20 minutes,  2-3 times a day at a minimum.   · Most patients are off the pain pills by 3 weeks. If your pain continues to be severe, follow up with your provider.     Infection   Deep hip joint infections that require removal of the prostheses occur in less than 1.0% of patients. Lesser infections in the skin (cellulites) are more common and much more easily treated.   · Keep the incision as clean and dry as possible.   · Always wash your hands before touching your incision.   · Avoid dental care for 3 months after surgery. Your provider may recommend taking a dose of antibiotics an hour prior to any dental procedure. After 2 years, most providers recommend antibiotics only before an extensive procedure. Ask your provider what they recommend.   · Signs and symptoms of infection include low-grade fever, redness, pain, swelling and drainage from your incision. Notify your provider IMMEDIATELY if you develop ANY of these symptoms.     Post op Disturbances   · Bowel Habits - constipation is extremely common and caused by a combination of anesthesia, lack of mobility, dehydration and pain medicine. Use stool softeners or laxatives if necessary. It is important not to ignore this problem as bowel obstructions can be a serious complication after joint replacement surgery.   · Mood/Energy Level - Many patients experience a lack of energy and endurance for up to 2-3 months after surgery. Some people feel down and can even become depressed. This is likely due to postoperative anemia, change in activity level, lack of sleep, pain medicine and just the emotional reaction to the surgery itself that is a big disruption in a person’s life. This usually passes. If symptoms persist, follow up with your primary care provider.   · Returning to Work - Your provider will give you specific instructions based on your profession. Generally, if you work a sedentary job requiring little standing or walking, most patients may return within 2-6 weeks.  Manual labor jobs involving walking, lifting and standing may take 3-4 months. Your provider’s office can provide a release to part-time or light duty work early on in your recovery and progress you to full duty as able.   · Driving - You can begin driving once cleared by your provider, provided you are no longer taking narcotic pain medication or any other medications that impair driving. Discuss the length of time with your doctor as returning to driving will depend on things such as your vehicle, which hip was replaced (right or left) and if you do or do not have post-operative movement precautions.   · Avoiding falls - A fall during the first few weeks after surgery can damage your new hip and may result in a need for further surgery.  throw rugs and tack down loose carpeting. Be aware of floor hazards such as pets, small objects or uneven surfaces. Notify your provider of any falls.   · Airport Metal Detectors - The sensitivity of metal detectors varies and it is likely that your prosthesis will cause an alarm. Inform the  of your artificial joint.     Diet   · Resume your normal diet as tolerated.   · It is important to achieve a healthy nutritional status by eating a well-balanced diet on a regular basis.   · Your provider may recommend that you take iron and vitamin supplements.   · Continue to drink plenty of fluids.     Shower/Bathing   · You may shower as soon as you get home from the hospital unless otherwise instructed.   · Keep your incision out of water to prevent infection. To keep the incision dry when showering, cover it with a plastic bag or plastic wrap. If your bandage is waterproof, this may not be necessary.   · Pat incision dry if it gets wet. Do not rub. Notify your provider.   · Do not submerge in a bath until cleared by your provider. Your staples must be out and the incision completely healed.     Dressing Changes: Only change your dressing if directed by your  provider.   · Wash hands.   · Open all dressing change materials.   · Remove old dressing and discard.   · Inspect incision for signs of irritation or infection including redness, increase in clear drainage, yellow/green drainage, odor and surrounding skin hot to touch. Notify your provider if present.   ·  the new dressing by one corner and lay over the incision. Be careful not to touch the inside of the dressing that will lay over the incision.   · Secure in place as instructed.     Swelling/Bruising   · Swelling is normal after hip replacement and can involve the thigh, knee, calf and foot.   · Swelling can last from 3-6 months.   · To reduce swelling, elevate your leg higher than your heart while reclining. The first week you are home you should elevate your leg an equal amount of time as you are active.   · The swelling is usually worse after you go home since you are upright for longer periods of time.   · Bruising often does not appear until after you arrive home and can be quite dramatic- appearing purple, black, or green. Bruising is typically not concerning and will subside without any treatment.     Blood Clot Prevention   Your treatment plan includes multiple preventative measures to decrease the risk of blood clots in the legs (DVTs) and the less common, but serious, clots that travel to the lungs (pulmonary emboli). Most patients are at standard risk for them, but people who are at higher risk include those who have had previous clots, a family history of clotting, smoking, diabetes, obesity, advanced age, use estrogen and/or live a sedentary lifestyle.     · Signs of blood clots in legs include - Swelling in thigh, calf or ankle that does not go down with elevation. Pain, heat and tenderness in calf, back of calf or groin area. NOTE: blood clots can occur in either leg.   · Signs of blood clots in lungs include - Sudden increased shortness of breath, sudden onset of chest pain, and localized  chest pain with coughing.   · If you experience any of the above symptoms, notify your provider and seek medical attention immediately.   · You received anticoagulant therapy (blood thinners) in the hospital. Continue the prescribed blood-thinning medication at home, as directed by your provider.   · Your risk for developing a clot continues for up to 2-3 months after surgery. Avoid prolonged sitting and dehydration (long air trips and car trips). If you do take a trip during this time, please get up, move around every 1-1.5 hours, and discuss all travel plans with your provider.     Activity   Once you get home, stay active. The key is not to overdo it. While you can expect some good days and some bad days, you should notice a gradual improvement over time and a gradual increase in endurance over the next 6 to 12 months.     · Weight Bearing - You can begin to bear weight as tolerated unless otherwise directed by your provider. Use a walker, crutches or cane to assist with walking until you can walk smoothly (minimal or no limp) without assistance.   · Physical Precautions - To assure proper recovery and tissue healing, you may be asked to take special precautions when moving (sitting, bending or sleeping) - usually for the first 6 weeks after surgery. Precautions vary from patient to patient depending on surgical approach used by your provider. Follow any specific precautions provided by your provider and physical therapist until cleared by your provider.   · Sitting - Early on it is easier to get up from a tall chair or a chair with arms. The physical therapist will show you how to sit and stand from a chair keeping your affected leg out in front of you. Get up and move around on a regular basis--at least once every hour.   · Walking - Walk as much as you like once your doctor gives permission to proceed, but remember that walking is no substitute for your prescribed exercises.  · Follow the home exercise program  prescribed during your hospital stay as directed by your physical therapist or provider.   · Continued physical therapy may be prescribed after hospital discharge to strengthen your muscles and improve your gait/walking pattern. The decision to have therapy or not after the hospital stay is made by you and your provider prior to surgery or at your follow up appointment.   · Swimming is an excellent low impact activity; you can begin as soon as the wound is healed and your provider clears you. Using a pair of training fins may make swimming a more enjoyable and effective exercise.   · Sexual activity - Your provider can tell you when it is safe to resume sexual activity.   · Other activities - Low impact activities are preferred. If you have specific questions, consult your provider.     When to Call the Doctor   Call the provider if you experience:   · Fever over 100.5° F   · Increased pain, drainage, redness, odor or heat around the incision area   · Shaking chills   · Increased knee pain with activity and rest   · Increased pain in calf, tenderness or redness above or below the knee   · Increased swelling of calf, ankle, foot   · Sudden increased shortness of breath, sudden onset of chest pain, localized chest pain with coughing   · Incision opening   Or, if there are any questions or concerns about medications or care.     Infection statistic resource:   [email or web address]/contents/prosthetic-joint-infection-epidemiology-microbiology-clinical-manifestations-and-diagnosis     -Is this patient being discharged with medication to prevent blood clots?  Yes, Aspirin Aspirin, ASA oral tablets  What is this medicine?  ASPIRIN (AS pir in) is a pain reliever. It is used to treat mild pain and fever. This medicine is also used as directed by a doctor to prevent and to treat heart attacks, to prevent strokes and blood clots, and to treat arthritis or inflammation.  This medicine may be used for other purposes; ask  your health care provider or pharmacist if you have questions.  COMMON BRAND NAME(S): Aspir-Low, Aspir-Kala, Aspirtab, April Advanced Aspirin, April Aspirin, April Aspirin Extra Strength, April Aspirin Plus, April Extra Strength, April Extra Strength Plus, April Genuine Aspirin, April Womens Aspirin, Bufferin, Bufferin Extra Strength, Bufferin Low Dose  What should I tell my health care provider before I take this medicine?  They need to know if you have any of these conditions:  · anemia  · asthma  · bleeding problems  · child with chickenpox, the flu, or other viral infection  · diabetes  · gout  · if you frequently drink alcohol containing drinks  · kidney disease  · liver disease  · low level of vitamin K  · lupus  · smoke tobacco  · stomach ulcers or other problems  · an unusual or allergic reaction to aspirin, tartrazine dye, other medicines, dyes, or preservatives  · pregnant or trying to get pregnant  · breast-feeding  How should I use this medicine?  Take this medicine by mouth with a glass of water. Follow the directions on the package or prescription label. You can take this medicine with or without food. If it upsets your stomach, take it with food. Do not take your medicine more often than directed.  Talk to your pediatrician regarding the use of this medicine in children. While this drug may be prescribed for children as young as 12 years of age for selected conditions, precautions do apply. Children and teenagers should not use this medicine to treat chicken pox or flu symptoms unless directed by a doctor.  Patients over 65 years old may have a stronger reaction and need a smaller dose.  Overdosage: If you think you have taken too much of this medicine contact a poison control center or emergency room at once.  NOTE: This medicine is only for you. Do not share this medicine with others.  What if I miss a dose?  If you are taking this medicine on a regular schedule and miss a dose, take it as soon as  you can. If it is almost time for your next dose, take only that dose. Do not take double or extra doses.  What may interact with this medicine?  Do not take this medicine with any of the following medications:  · cidofovir  · ketorolac  · probenecid  This medicine may also interact with the following medications:  · alcohol  · alendronate  · bismuth subsalicylate  · flavocoxid  · herbal supplements like feverfew, garlic, morgan, ginkgo biloba, horse chestnut  · medicines for diabetes or glaucoma like acetazolamide, methazolamide  · medicines for gout  · medicines that treat or prevent blood clots like enoxaparin, heparin, ticlopidine, warfarin  · other aspirin and aspirin-like medicines  · NSAIDs, medicines for pain and inflammation, like ibuprofen or naproxen  · pemetrexed  · sulfinpyrazone  · varicella live vaccine  This list may not describe all possible interactions. Give your health care provider a list of all the medicines, herbs, non-prescription drugs, or dietary supplements you use. Also tell them if you smoke, drink alcohol, or use illegal drugs. Some items may interact with your medicine.  What should I watch for while using this medicine?  If you are treating yourself for pain, tell your doctor or health care professional if the pain lasts more than 10 days, if it gets worse, or if there is a new or different kind of pain. Tell your doctor if you see redness or swelling. Also, check with your doctor if you have a fever that lasts for more than 3 days. Only take this medicine to prevent heart attacks or blood clotting if prescribed by your doctor or health care professional.  Do not take aspirin or aspirin-like medicines with this medicine. Too much aspirin can be dangerous. Always read the labels carefully.  This medicine can irritate your stomach or cause bleeding problems. Do not smoke cigarettes or drink alcohol while taking this medicine. Do not lie down for 30 minutes after taking this medicine to  prevent irritation to your throat.  If you are scheduled for any medical or dental procedure, tell your healthcare provider that you are taking this medicine. You may need to stop taking this medicine before the procedure.  This medicine may be used to treat migraines. If you take migraine medicines for 10 or more days a month, your migraines may get worse. Keep a diary of headache days and medicine use. Contact your healthcare professional if your migraine attacks occur more frequently.  What side effects may I notice from receiving this medicine?  Side effects that you should report to your doctor or health care professional as soon as possible:  · allergic reactions like skin rash, itching or hives, swelling of the face, lips, or tongue  · breathing problems  · changes in hearing, ringing in the ears  · confusion  · general ill feeling or flu-like symptoms  · pain on swallowing  · redness, blistering, peeling or loosening of the skin, including inside the mouth or nose  · signs and symptoms of bleeding such as bloody or black, tarry stools; red or dark-brown urine; spitting up blood or brown material that looks like coffee grounds; red spots on the skin; unusual bruising or bleeding from the eye, gums, or nose  · trouble passing urine or change in the amount of urine  · unusually weak or tired  · yellowing of the eyes or skin  Side effects that usually do not require medical attention (report to your doctor or health care professional if they continue or are bothersome):  · diarrhea or constipation  · headache  · nausea, vomiting  · stomach gas, heartburn  This list may not describe all possible side effects. Call your doctor for medical advice about side effects. You may report side effects to FDA at 6-676-FDA-4664.  Where should I keep my medicine?  Keep out of the reach of children.  Store at room temperature between 15 and 30 degrees C (59 and 86 degrees F). Protect from heat and moisture. Do not use this  medicine if it has a strong vinegar smell. Throw away any unused medicine after the expiration date.  NOTE: This sheet is a summary. It may not cover all possible information. If you have questions about this medicine, talk to your doctor, pharmacist, or health care provider.  © 2020 [employer] (2018-01-30 10:42:13)      · Is patient discharged on Warfarin / Coumadin?   No     Depression / Suicide Risk    As you are discharged from this [facility] facility, it is important to learn how to keep safe from harming yourself.    Recognize the warning signs:  · Abrupt changes in personality, positive or negative- including increase in energy   · Giving away possessions  · Change in eating patterns- significant weight changes-  positive or negative  · Change in sleeping patterns- unable to sleep or sleeping all the time   · Unwillingness or inability to communicate  · Depression  · Unusual sadness, discouragement and loneliness  · Talk of wanting to die  · Neglect of personal appearance   · Rebelliousness- reckless behavior  · Withdrawal from people/activities they love  · Confusion- inability to concentrate     If you or a loved one observes any of these behaviors or has concerns about self-harm, here's what you can do:  · Talk about it- your feelings and reasons for harming yourself  · Remove any means that you might use to hurt yourself (examples: pills, rope, extension cords, firearm)  · Get professional help from the community (Mental Health, Substance Abuse, psychological counseling)  · Do not be alone:Call your Safe Contact- someone whom you trust who will be there for you.  · Call your local CRISIS HOTLINE 024-7032 or 041-446-8465  · Call your local Children's Mobile Crisis Response Team [place] (656) 197-3879 or www.[employer]  · Call the toll free National Suicide Prevention Hotlines   · National Suicide Prevention Lifeline 399-574-AZWP (0667)  · National Hope Line Network 800-SUICIDE  (898-3567)

## 2020-07-14 NOTE — ANESTHESIA POSTPROCEDURE EVALUATION
Patient: Smith Gloria    Procedure Summary     Date:  07/13/20 Room / Location:   OR  / SURGERY [facility]    Anesthesia Start:  1329 Anesthesia Stop:  1519    Procedure:  ARTHROPLASTY, HIP, TOTAL - AND SALDANA (Right Hip) Diagnosis:  (OA OF RIGHT HIP)    Surgeon:  Herber Maya M.D. Responsible Provider:  Antonio Tompkins M.D.    Anesthesia Type:  general ASA Status:  2          Final Anesthesia Type: general  Last vitals  BP   Blood Pressure: 133/74    Temp   36.6 °C (97.9 °F)    Pulse   Pulse: 70   Resp   18    SpO2   92 %      Anesthesia Post Evaluation    Patient location during evaluation: PACU  Patient participation: complete - patient participated  Level of consciousness: awake and alert    Airway patency: patent  Anesthetic complications: no  Cardiovascular status: hemodynamically stable  Respiratory status: acceptable  Hydration status: euvolemic    PONV: none           Nurse Pain Score: 4 (NPRS)

## 2020-07-14 NOTE — PROGRESS NOTES
A&Ox4, VSS, pain controlled, pt has ambulated 150 ft x2, voided 600mL. Tolerating PO intake, denies nausea, sob, or dizziness. Discharge instructions reviewed, all questions answered. Pt confirmed that he has crutches at home for ambulation and has received home prescriptions.

## 2020-07-14 NOTE — PROGRESS NOTES
Spoke with Dr. Maya regarding pt discharge today. Dr. Maya stated discharge is okay once discharge criteria has been met.

## 2020-07-14 NOTE — PROGRESS NOTES
Patient to floor at 1700.  Hoping to d/c today.  PT/OT unable to see this evening.  Will reach out to Dr. Maya to see if he is agreeable to patient going home this evening if all markers are met.  Patient aware.  Really hoping to leave as he has back pain that is uncomfortable for him in the hospital bed.

## 2020-08-25 ENCOUNTER — PATIENT MESSAGE (OUTPATIENT)
Dept: MEDICAL GROUP | Facility: MEDICAL CENTER | Age: 60
End: 2020-08-25

## 2020-08-25 ENCOUNTER — TELEPHONE (OUTPATIENT)
Dept: MEDICAL GROUP | Facility: MEDICAL CENTER | Age: 60
End: 2020-08-25

## 2020-08-25 DIAGNOSIS — Z91.09 ENVIRONMENTAL ALLERGIES: ICD-10-CM

## 2020-08-25 RX ORDER — MONTELUKAST SODIUM 10 MG/1
10 TABLET ORAL DAILY
Qty: 30 TAB | Refills: 3 | Status: SHIPPED | OUTPATIENT
Start: 2020-08-25 | End: 2021-12-01

## 2020-08-25 RX ORDER — AZELASTINE 1 MG/ML
1 SPRAY, METERED NASAL 2 TIMES DAILY
Qty: 30 ML | Refills: 3 | Status: SHIPPED | OUTPATIENT
Start: 2020-08-25 | End: 2021-12-01

## 2020-08-26 ENCOUNTER — APPOINTMENT (OUTPATIENT)
Dept: MEDICAL GROUP | Facility: MEDICAL CENTER | Age: 60
End: 2020-08-26
Payer: COMMERCIAL

## 2020-08-26 NOTE — TELEPHONE ENCOUNTER
Patient is requesting something for his allergies and OTC medications are not working and [place]. Requesting referral.

## 2020-08-27 ENCOUNTER — OFFICE VISIT (OUTPATIENT)
Dept: URGENT CARE | Facility: PHYSICIAN GROUP | Age: 60
End: 2020-08-27
Payer: COMMERCIAL

## 2020-08-27 VITALS
BODY MASS INDEX: 34.36 KG/M2 | RESPIRATION RATE: 16 BRPM | HEIGHT: 70 IN | DIASTOLIC BLOOD PRESSURE: 84 MMHG | WEIGHT: 240 LBS | TEMPERATURE: 97.9 F | SYSTOLIC BLOOD PRESSURE: 128 MMHG | HEART RATE: 75 BPM | OXYGEN SATURATION: 94 %

## 2020-08-27 DIAGNOSIS — J30.2 SEASONAL ALLERGIES: ICD-10-CM

## 2020-08-27 PROCEDURE — 99213 OFFICE O/P EST LOW 20 MIN: CPT | Performed by: FAMILY MEDICINE

## 2020-08-27 RX ORDER — FLUTICASONE PROPIONATE 50 MCG
1 SPRAY, SUSPENSION (ML) NASAL DAILY
COMMUNITY
End: 2021-12-01

## 2020-08-27 RX ORDER — LORATADINE 10 MG/1
10 TABLET ORAL DAILY
COMMUNITY
End: 2021-12-01

## 2020-08-27 RX ORDER — PSEUDOEPHEDRINE HCL 30 MG
60 TABLET ORAL EVERY 4 HOURS PRN
COMMUNITY
End: 2021-12-01

## 2020-08-27 RX ORDER — TRIAMCINOLONE ACETONIDE 40 MG/ML
60 INJECTION, SUSPENSION INTRA-ARTICULAR; INTRAMUSCULAR ONCE
Status: COMPLETED | OUTPATIENT
Start: 2020-08-27 | End: 2020-08-27

## 2020-08-27 RX ADMIN — TRIAMCINOLONE ACETONIDE 60 MG: 40 INJECTION, SUSPENSION INTRA-ARTICULAR; INTRAMUSCULAR at 11:44

## 2020-08-27 ASSESSMENT — ENCOUNTER SYMPTOMS
SENSORY CHANGE: 0
FEVER: 0
SHORTNESS OF BREATH: 0
FOCAL WEAKNESS: 0
WHEEZING: 0
CHILLS: 0
COUGH: 0

## 2020-08-27 ASSESSMENT — FIBROSIS 4 INDEX: FIB4 SCORE: 0.81

## 2020-08-27 NOTE — PROGRESS NOTES
"Subjective:      Smith Gloria is a 59 y.o. male who presents with Seasonal Allergies (eye irritation, scratchy throat)            Recurrent problem. Severe seasonal allergies late summer through fall. He has been using antihistamine, decongestant, and nasal CS with minimal relief. Eyes are red and itching. Rhinorrhea persists. No other aggravating or alleviating factors.        Review of Systems   Constitutional: Negative for chills and fever.   Respiratory: Negative for cough, shortness of breath and wheezing.    Skin: Negative for rash.   Neurological: Negative for sensory change and focal weakness.   .     Objective:     /84   Pulse 75   Temp 36.6 °C (97.9 °F)   Resp 16   Ht 1.778 m (5' 10\")   Wt 108.9 kg (240 lb)   SpO2 94%   BMI 34.44 kg/m²      Physical Exam  Constitutional:       Appearance: Normal appearance.   HENT:      Head: Normocephalic and atraumatic.      Right Ear: Tympanic membrane normal.      Left Ear: Tympanic membrane normal.      Nose: Congestion and rhinorrhea present.      Comments: Swollen turbinates     Mouth/Throat:      Mouth: Mucous membranes are moist.      Pharynx: Oropharynx is clear.   Cardiovascular:      Rate and Rhythm: Normal rate and regular rhythm.      Pulses: Normal pulses.      Heart sounds: Normal heart sounds.   Pulmonary:      Effort: Pulmonary effort is normal.      Breath sounds: Normal breath sounds. No wheezing.   Skin:     General: Skin is warm and dry.      Findings: No rash.   Neurological:      Mental Status: He is alert.                 Assessment/Plan:        1. Seasonal allergies    - triamcinolone acetonide (KENALOG-40) injection 60 mg    Differential diagnosis, natural history, supportive care, and indications for immediate follow-up discussed at length.     Risk of medication discussed at length  "

## 2021-03-13 ENCOUNTER — PATIENT MESSAGE (OUTPATIENT)
Dept: MEDICAL GROUP | Facility: MEDICAL CENTER | Age: 61
End: 2021-03-13

## 2021-03-13 RX ORDER — TERBINAFINE HYDROCHLORIDE 250 MG/1
250 TABLET ORAL DAILY
Qty: 30 TABLET | Refills: 3 | Status: SHIPPED | OUTPATIENT
Start: 2021-03-13 | End: 2021-06-10

## 2021-03-13 NOTE — TELEPHONE ENCOUNTER
From: Smith Gloria  To: Nurse Practicioner Shayla Rush  Sent: 3/13/2021 6:20 AM PST  Subject: Prescription Question    Hello and good morning    Mrs. Victor Manuel gloria here I went to finish my fungal treatment now that my hip has been in 6 months I said I should have been able to fill until 3/11 21 . I only did 60 days stopped meds 45 days before surgery had his blood work done healed for 6 months. Wanted to finish.   The affect was good for my toes but can tell the last 30 day treatment would help push it all the way out if nail bed..   side bar the one side affect that I notice is sore muscle bones .. that I noticed .I had 5 pills left out of first 60 days started to finish   And pharmacy said it stoped . If you need blood work again send order I'll go get drawn also order to check for antibody for Covid I thought I had it early February last year . Plan on getting shots when available for under 60  Thank you    Smith gloria

## 2021-03-15 DIAGNOSIS — Z23 NEED FOR VACCINATION: ICD-10-CM

## 2021-06-10 ENCOUNTER — OFFICE VISIT (OUTPATIENT)
Dept: MEDICAL GROUP | Facility: MEDICAL CENTER | Age: 61
End: 2021-06-10
Payer: COMMERCIAL

## 2021-06-10 VITALS
DIASTOLIC BLOOD PRESSURE: 100 MMHG | TEMPERATURE: 96.8 F | WEIGHT: 253 LBS | BODY MASS INDEX: 36.22 KG/M2 | SYSTOLIC BLOOD PRESSURE: 150 MMHG | OXYGEN SATURATION: 97 % | HEART RATE: 72 BPM | RESPIRATION RATE: 20 BRPM | HEIGHT: 70 IN

## 2021-06-10 DIAGNOSIS — E66.9 OBESITY, CLASS II, BMI 35-39.9, ISOLATED (SEE ACTUAL BMI): ICD-10-CM

## 2021-06-10 DIAGNOSIS — Z13.29 SCREENING FOR ENDOCRINE DISORDER: ICD-10-CM

## 2021-06-10 DIAGNOSIS — Z96.642 S/P HIP REPLACEMENT, LEFT: ICD-10-CM

## 2021-06-10 DIAGNOSIS — I10 ESSENTIAL HYPERTENSION: ICD-10-CM

## 2021-06-10 DIAGNOSIS — J30.2 SEASONAL ALLERGIES: ICD-10-CM

## 2021-06-10 DIAGNOSIS — R01.1 CARDIAC MURMUR: ICD-10-CM

## 2021-06-10 DIAGNOSIS — G56.02 CARPAL TUNNEL SYNDROME, LEFT: ICD-10-CM

## 2021-06-10 DIAGNOSIS — E78.00 PURE HYPERCHOLESTEROLEMIA: ICD-10-CM

## 2021-06-10 DIAGNOSIS — Z12.5 SCREENING PSA (PROSTATE SPECIFIC ANTIGEN): ICD-10-CM

## 2021-06-10 PROCEDURE — 99214 OFFICE O/P EST MOD 30 MIN: CPT | Performed by: NURSE PRACTITIONER

## 2021-06-10 RX ORDER — LOSARTAN POTASSIUM 50 MG/1
50 TABLET ORAL DAILY
Qty: 30 TABLET | Refills: 5 | Status: SHIPPED | OUTPATIENT
Start: 2021-06-10 | End: 2021-06-18

## 2021-06-10 ASSESSMENT — PATIENT HEALTH QUESTIONNAIRE - PHQ9: CLINICAL INTERPRETATION OF PHQ2 SCORE: 0

## 2021-06-10 ASSESSMENT — FIBROSIS 4 INDEX: FIB4 SCORE: 0.83

## 2021-06-10 NOTE — ASSESSMENT & PLAN NOTE
Reports that this is been an ongoing symptom for several years but seems to be gradually worsening.  Now has numbness in all the digits of the left hand, affecting daily activity.  He had tried using a wrist brace without any improvement.  No medication use, no change in range of motion or history of wrist injury

## 2021-06-10 NOTE — ASSESSMENT & PLAN NOTE
Blood pressure 150/100 in clinic today.  He has gained about 13 pounds since his last visit which may be a contributing factor.  Not exercising as regularly.  No chest pain, dizziness, palpitation

## 2021-06-10 NOTE — PROGRESS NOTES
Subjective:     Chief Complaint   Patient presents with   • Advice Only     BP follow up, requesting labs and referral for carpal tunnel     Smith Jasen Gloria is a 60 y.o. male here today to follow up on chronic conditions    S/P hip replacement, left  Doing better with mobility      Obesity, Class II, BMI 35-39.9, isolated (see actual BMI)  Gained about 10 lbs over the last year, had not been getting as much exercise with his hip troubles    Seasonal allergies  Typically worse in the fall but has had trouble this spring  Using flonase or azelastine prn    Carpal tunnel syndrome, left  Reports that this is been an ongoing symptom for several years but seems to be gradually worsening.  Now has numbness in all the digits of the left hand, affecting daily activity.  He had tried using a wrist brace without any improvement.  No medication use, no change in range of motion or history of wrist injury    Essential hypertension  Blood pressure 150/100 in clinic today.  He has gained about 13 pounds since his last visit which may be a contributing factor.  Not exercising as regularly.  No chest pain, dizziness, palpitation       Current medicines (including changes today)  Current Outpatient Medications   Medication Sig Dispense Refill   • losartan (COZAAR) 50 MG Tab Take 1 tablet by mouth every day. 30 tablet 5   • pseudoephedrine (SUDAFED) 30 MG Tab Take 60 mg by mouth every four hours as needed for Congestion.     • fluticasone (FLONASE) 50 MCG/ACT nasal spray Spray 1 Spray in nose every day.     • loratadine (CLARITIN) 10 MG Tab Take 10 mg by mouth every day.     • azelastine (ASTELIN) 137 MCG/SPRAY nasal spray [place] 1 Spray in nose 2 times a day. 30 mL 3   • montelukast (SINGULAIR) 10 MG Tab Take 1 Tab by mouth every day. 30 Tab 3   • ibuprofen (MOTRIN) 200 MG Tab Take 800 mg by mouth every 6 hours as needed.       No current facility-administered medications for this visit.     He  has a past medical history of  "Arthritis, Cancer (HCC), Cataract, and Heart murmur.    ROS included above     Objective:     /100 (BP Location: Right arm, Patient Position: Sitting, BP Cuff Size: Large adult)   Pulse 72   Temp 36 °C (96.8 °F) (Temporal)   Resp 20   Ht 1.778 m (5' 10\")   Wt 115 kg (253 lb)   SpO2 97%  Body mass index is 36.3 kg/m².     Physical Exam:  General: Alert, oriented in no acute distress.  Eye contact is good, speech is normal, affect calm.  Lungs: clear to auscultation bilaterally, normal effort, no wheeze/ rhonchi/ rales.  CV: regular rate and rhythm, S1, S2, no murmur  Abdomen: soft, nontender  Ext: no edema, color normal, vascularity normal, temperature normal    Assessment and Plan:   The following treatment plan was discussed   1. Obesity, Class II, BMI 35-39.9, isolated (see actual BMI)  Patient identified as having weight management issue.  Appropriate orders and counseling given.  We discussed that weight loss may help to improve his blood pressure   2. Seasonal allergies   continue antihistamine and nasal spray, stop Sudafed   3. Essential hypertension   blood pressure in hypertensive range.  He has gained about 13 pounds which may be a contributing factor.  Diet, exercise, weight loss discussed.  We will start trial of losartan, obtain labs.  Follow-up in office in about 3 weeks  MICROALBUMIN CREAT RATIO URINE    losartan (COZAAR) 50 MG Tab   4. Cardiac murmur   murmur on exam, no prior evaluation.  EC-ECHOCARDIOGRAM COMPLETE W/O CONT   5. Pure hypercholesterolemia   no current treatment, working on diet and exercise regimen  Comp Metabolic Panel    Lipid Profile   6. Carpal tunnel syndrome, left   persistent problem with recent worsening, no improvement with use of hand brace or NSAID.  Will refer to orthopedics for further recommendation  REFERRAL TO ORTHOPEDICS   7. S/P hip replacement, left     8. Screening for endocrine disorder  TSH WITH REFLEX TO FT4    TESTOSTERONE SERUM    HEMOGLOBIN A1C "   9. Screening PSA (prostate specific antigen)  PROSTATE SPECIFIC AG SCREENING       Followup: 3 weeks with labs prior         Please note that this dictation was created using voice recognition software. I have worked with consultants from the vendor as well as technical experts from [facility] [employer] to optimize the interface. I have made every reasonable attempt to correct obvious errors, but I expect that there are errors of grammar and possibly content that I did not discover before finalizing the note.

## 2021-06-10 NOTE — ASSESSMENT & PLAN NOTE
Gained about 10 lbs over the last year, had not been getting as much exercise with his hip troubles

## 2021-06-18 ENCOUNTER — PATIENT MESSAGE (OUTPATIENT)
Dept: MEDICAL GROUP | Facility: MEDICAL CENTER | Age: 61
End: 2021-06-18

## 2021-06-18 RX ORDER — AMLODIPINE BESYLATE 5 MG/1
5 TABLET ORAL
Qty: 30 TABLET | Refills: 1 | Status: SHIPPED | OUTPATIENT
Start: 2021-06-18 | End: 2021-08-30

## 2021-06-24 ENCOUNTER — HOSPITAL ENCOUNTER (OUTPATIENT)
Dept: LAB | Facility: MEDICAL CENTER | Age: 61
End: 2021-06-24
Attending: NURSE PRACTITIONER
Payer: COMMERCIAL

## 2021-06-24 DIAGNOSIS — Z13.29 SCREENING FOR ENDOCRINE DISORDER: ICD-10-CM

## 2021-06-24 DIAGNOSIS — I10 ESSENTIAL HYPERTENSION: ICD-10-CM

## 2021-06-24 DIAGNOSIS — Z12.5 SCREENING PSA (PROSTATE SPECIFIC ANTIGEN): ICD-10-CM

## 2021-06-24 DIAGNOSIS — E78.00 PURE HYPERCHOLESTEROLEMIA: ICD-10-CM

## 2021-06-24 LAB
ALBUMIN SERPL BCP-MCNC: 4.2 G/DL (ref 3.2–4.9)
ALBUMIN/GLOB SERPL: 1.4 G/DL
ALP SERPL-CCNC: 73 U/L (ref 30–99)
ALT SERPL-CCNC: 35 U/L (ref 2–50)
ANION GAP SERPL CALC-SCNC: 11 MMOL/L (ref 7–16)
AST SERPL-CCNC: 26 U/L (ref 12–45)
BILIRUB SERPL-MCNC: 0.5 MG/DL (ref 0.1–1.5)
BUN SERPL-MCNC: 16 MG/DL (ref 8–22)
CALCIUM SERPL-MCNC: 8.8 MG/DL (ref 8.5–10.5)
CHLORIDE SERPL-SCNC: 105 MMOL/L (ref 96–112)
CHOLEST SERPL-MCNC: 211 MG/DL (ref 100–199)
CO2 SERPL-SCNC: 24 MMOL/L (ref 20–33)
CREAT SERPL-MCNC: 0.84 MG/DL (ref 0.5–1.4)
CREAT UR-MCNC: 138.67 MG/DL
EST. AVERAGE GLUCOSE BLD GHB EST-MCNC: 111 MG/DL
FASTING STATUS PATIENT QL REPORTED: NORMAL
GLOBULIN SER CALC-MCNC: 2.9 G/DL (ref 1.9–3.5)
GLUCOSE SERPL-MCNC: 105 MG/DL (ref 65–99)
HBA1C MFR BLD: 5.5 % (ref 4–5.6)
HDLC SERPL-MCNC: 45 MG/DL
LDLC SERPL CALC-MCNC: 145 MG/DL
MICROALBUMIN UR-MCNC: <1.2 MG/DL
MICROALBUMIN/CREAT UR: NORMAL MG/G (ref 0–30)
POTASSIUM SERPL-SCNC: 4.2 MMOL/L (ref 3.6–5.5)
PROT SERPL-MCNC: 7.1 G/DL (ref 6–8.2)
PSA SERPL-MCNC: 0.81 NG/ML (ref 0–4)
SODIUM SERPL-SCNC: 140 MMOL/L (ref 135–145)
TESTOST SERPL-MCNC: 323 NG/DL (ref 175–781)
TRIGL SERPL-MCNC: 106 MG/DL (ref 0–149)
TSH SERPL DL<=0.005 MIU/L-ACNC: 1.59 UIU/ML (ref 0.38–5.33)

## 2021-06-24 PROCEDURE — 84403 ASSAY OF TOTAL TESTOSTERONE: CPT

## 2021-06-24 PROCEDURE — 80053 COMPREHEN METABOLIC PANEL: CPT

## 2021-06-24 PROCEDURE — 84443 ASSAY THYROID STIM HORMONE: CPT

## 2021-06-24 PROCEDURE — 36415 COLL VENOUS BLD VENIPUNCTURE: CPT

## 2021-06-24 PROCEDURE — 80061 LIPID PANEL: CPT

## 2021-06-24 PROCEDURE — 83036 HEMOGLOBIN GLYCOSYLATED A1C: CPT

## 2021-06-24 PROCEDURE — 84153 ASSAY OF PSA TOTAL: CPT

## 2021-06-24 PROCEDURE — 82043 UR ALBUMIN QUANTITATIVE: CPT

## 2021-06-24 PROCEDURE — 82570 ASSAY OF URINE CREATININE: CPT

## 2021-06-30 NOTE — TELEPHONE ENCOUNTER
From: Smith Gloria  To: TAYLOR Crump  Sent: 8/25/2020 5:30 PM PDT  Subject: Prescription Question    Thank you for your call .  I'm taking Claritin d 24 hr .   Fluticasone nasal spray , allergy eye drops    This keeps my going but I'm still miserable . Going up to [facility] , it's all the same as my home. Ignacio bitter brush tumble weed thistle ,   It's what blooms in the fall it KILLS me.   Thank you  Smith Gloria   done

## 2021-07-01 ENCOUNTER — OFFICE VISIT (OUTPATIENT)
Dept: MEDICAL GROUP | Facility: MEDICAL CENTER | Age: 61
End: 2021-07-01
Payer: COMMERCIAL

## 2021-07-01 VITALS
HEIGHT: 70 IN | RESPIRATION RATE: 20 BRPM | SYSTOLIC BLOOD PRESSURE: 134 MMHG | TEMPERATURE: 98 F | OXYGEN SATURATION: 96 % | BODY MASS INDEX: 35.79 KG/M2 | DIASTOLIC BLOOD PRESSURE: 78 MMHG | WEIGHT: 250 LBS | HEART RATE: 73 BPM

## 2021-07-01 DIAGNOSIS — E78.00 PURE HYPERCHOLESTEROLEMIA: ICD-10-CM

## 2021-07-01 DIAGNOSIS — I10 ESSENTIAL HYPERTENSION: ICD-10-CM

## 2021-07-01 DIAGNOSIS — G56.02 CARPAL TUNNEL SYNDROME, LEFT: ICD-10-CM

## 2021-07-01 DIAGNOSIS — R01.1 CARDIAC MURMUR: ICD-10-CM

## 2021-07-01 PROCEDURE — 99214 OFFICE O/P EST MOD 30 MIN: CPT | Performed by: NURSE PRACTITIONER

## 2021-07-01 ASSESSMENT — FIBROSIS 4 INDEX: FIB4 SCORE: 1.03

## 2021-07-01 NOTE — ASSESSMENT & PLAN NOTE
Recent labs reviewed, LDL elevated at 145.  Discussed increased risk for heart disease and various treatment options.  He is resistant to considering any medication I would like to focus on diet and exercise as well as weight loss.  CT for coronary calcium scoring recommended  Father had stent placed in his 80s, mother had a stroke also later in life

## 2021-07-01 NOTE — ASSESSMENT & PLAN NOTE
Prominent murmur noted on initial visit, states that he has had this all of his life and no difficulty with it.  I did order echocardiogram for baseline, he has declined to pursue

## 2021-07-01 NOTE — PROGRESS NOTES
Subjective:     Chief Complaint   Patient presents with   • Follow-Up     BP, labs      Smith Gloria is a 60 y.o. male here today to follow up on:    Essential hypertension  Started losartan last month but had severe heartburn, discontinued after only 3 days.  Was prescribed amlodipine and just started 2 days ago  Home BP readings ranging from 115-140 systolic, 70-80's diastolic   Riding bike for exercise  He is hoping to control BP with lifestyle measures and hopes to get off of medication eventually  Acknowledges he needs to work on weight loss, working on dietary changes.  No chest pain, dizziness, chronic headaches    Pure hypercholesterolemia  Recent labs reviewed, LDL elevated at 145.  Discussed increased risk for heart disease and various treatment options.  He is resistant to considering any medication I would like to focus on diet and exercise as well as weight loss.  CT for coronary calcium scoring recommended  Father had stent placed in his 80s, mother had a stroke also later in life    Cardiac murmur  Prominent murmur noted on initial visit, states that he has had this all of his life and no difficulty with it.  I did order echocardiogram for baseline, he has declined to pursue    Carpal tunnel syndrome, left  He has been referred to orthopedics and is awaiting nerve conduction test     Component      Latest Ref Rng & Units 6/24/2021 6/24/2021 6/24/2021 6/24/2021           6:50 AM  6:50 AM  6:50 AM  6:50 AM   Cholesterol,Tot      100 - 199 mg/dL  211 (H)     Triglycerides      0 - 149 mg/dL  106     HDL      >=40 mg/dL  45     LDL      <100 mg/dL  145 (H)     Glycohemoglobin      4.0 - 5.6 % 5.5      Estim. Avg Glu      mg/dL 111      Prostatic Specific Antigen Tot      0.00 - 4.00 ng/mL   0.81    Testosterone,Total      175 - 781 ng/dL    323   TSH      0.380 - 5.330 uIU/mL         Component      Latest Ref Rng & Units 6/24/2021           6:50 AM   Cholesterol,Tot      100 - 199 mg/dL   "  Triglycerides      0 - 149 mg/dL    HDL      >=40 mg/dL    LDL      <100 mg/dL    Glycohemoglobin      4.0 - 5.6 %    Estim. Avg Glu      mg/dL    Prostatic Specific Antigen Tot      0.00 - 4.00 ng/mL    Testosterone,Total      175 - 781 ng/dL    TSH      0.380 - 5.330 uIU/mL 1.590     He is interested in testosterone therapy.  Recent testosterone level in normal range    Current medicines (including changes today)  Current Outpatient Medications   Medication Sig Dispense Refill   • pseudoephedrine (SUDAFED) 30 MG Tab Take 60 mg by mouth every four hours as needed for Congestion.     • fluticasone (FLONASE) 50 MCG/ACT nasal spray Spray 1 Spray in nose every day.     • loratadine (CLARITIN) 10 MG Tab Take 10 mg by mouth every day.     • azelastine (ASTELIN) 137 MCG/SPRAY nasal spray [place] 1 Spray in nose 2 times a day. 30 mL 3   • montelukast (SINGULAIR) 10 MG Tab Take 1 Tab by mouth every day. 30 Tab 3   • ibuprofen (MOTRIN) 200 MG Tab Take 800 mg by mouth every 6 hours as needed.     • amLODIPine (NORVASC) 5 MG Tab Take 1 tablet by mouth at bedtime. 30 tablet 1     No current facility-administered medications for this visit.     He  has a past medical history of Arthritis, Cancer (HCC), Cataract, and Heart murmur.    ROS included above     Objective:     /78 (BP Location: Right arm, Patient Position: Sitting, BP Cuff Size: Large adult)   Pulse 73   Temp 36.7 °C (98 °F) (Temporal)   Resp 20   Ht 1.778 m (5' 10\")   Wt 113 kg (250 lb)   SpO2 96%  Body mass index is 35.87 kg/m².     Physical Exam:  General: Alert, oriented in no acute distress.  Eye contact is good, speech is normal, affect calm  Lungs: clear to auscultation bilaterally, normal effort, no wheeze/ rhonchi/ rales.  CV: regular rate and rhythm, S1, S2, 4 out of 6 systolic murmur  Abdomen: soft, nontender  Ext: no edema, color normal, vascularity normal, temperature normal    Assessment and Plan:   The following treatment plan was discussed "   1. Essential hypertension   now on amlodipine.  Plan for continued exercise, diet, weight loss discussed.  He will continue to monitor blood pressure at home and report back with readings  Comp Metabolic Panel   2. Pure hypercholesterolemia   cardiac risk discussed.  He is not interested in medication treatment options and would like to focus on diet and exercise.  Recommended focusing on mainly plant-based diet, reducing meat and dairy intake, continuing to work on weight loss.  Benefits of CT for coronary calcium scoring discussed, I have suggested that he pursue this although I am not sure he is going to follow this recommendation.  CT-CARDIAC SCORING    Lipid Profile   3. Cardiac murmur   recommend completing echocardiogram for baseline.  This is not caused him any difficulty   4. Carpal tunnel syndrome, left   awaiting nerve conduction test and will be following up with orthopedic       Followup: 6 months with labs prior       Please note that this dictation was created using voice recognition software. I have worked with consultants from the vendor as well as technical experts from [facility] [employer] to optimize the interface. I have made every reasonable attempt to correct obvious errors, but I expect that there are errors of grammar and possibly content that I did not discover before finalizing the note.

## 2021-07-01 NOTE — ASSESSMENT & PLAN NOTE
Started losartan last month but had severe heartburn, discontinued after only 3 days.  Was prescribed amlodipine and just started 2 days ago  Home BP readings ranging from 115-140 systolic, 70-80's diastolic   Riding bike for exercise  He is hoping to control BP with lifestyle measures and hopes to get off of medication eventually  Acknowledges he needs to work on weight loss, working on dietary changes.  No chest pain, dizziness, chronic headaches

## 2021-07-21 ENCOUNTER — HOSPITAL ENCOUNTER (OUTPATIENT)
Dept: RADIOLOGY | Facility: MEDICAL CENTER | Age: 61
End: 2021-07-21
Attending: NURSE PRACTITIONER
Payer: COMMERCIAL

## 2021-07-21 DIAGNOSIS — E78.00 PURE HYPERCHOLESTEROLEMIA: ICD-10-CM

## 2021-07-21 PROCEDURE — 4410556 CT-CARDIAC SCORING (SELF PAY ONLY)

## 2021-08-05 ENCOUNTER — HOSPITAL ENCOUNTER (OUTPATIENT)
Dept: RADIOLOGY | Facility: MEDICAL CENTER | Age: 61
End: 2021-08-05
Attending: ORTHOPAEDIC SURGERY
Payer: COMMERCIAL

## 2021-08-05 DIAGNOSIS — M25.511 RIGHT SHOULDER PAIN, UNSPECIFIED CHRONICITY: ICD-10-CM

## 2021-08-05 PROCEDURE — 73221 MRI JOINT UPR EXTREM W/O DYE: CPT | Mod: RT,MH

## 2021-08-05 PROCEDURE — A9270 NON-COVERED ITEM OR SERVICE: HCPCS | Performed by: RADIOLOGY

## 2021-08-05 PROCEDURE — 700102 HCHG RX REV CODE 250 W/ 637 OVERRIDE(OP): Performed by: RADIOLOGY

## 2021-08-05 RX ORDER — ALPRAZOLAM 1 MG/1
1 TABLET ORAL
Status: COMPLETED | OUTPATIENT
Start: 2021-08-05 | End: 2021-08-05

## 2021-08-05 RX ADMIN — ALPRAZOLAM 1 MG: 1 TABLET ORAL at 16:00

## 2021-08-05 NOTE — DISCHARGE INSTRUCTIONS
MRI ADULT DISCHARGE INSTRUCTIONS    You have been medicated today for your scan. Please follow the instructions below to ensure your safe recovery. If you have any questions or problems, feel free to call us at [phone] or [phone].     1.   Have someone stay with you to assist you as needed.    2.   Do not drive or operate any mechanical devices.    3.   Do not perform any activity that requires concentration. Make no major decisions over the next 24 hours.     4.   Be careful changing positions from laying down to sitting or standing, as you may become dizzy.     5.   Do not drink alcohol for 48 hours.    6.   There are no restrictions for eating your normal meals. Drink fluids.    7.   You may continue your usual medications for pain, tranquilizers, muscle relaxants or sedatives when awake.     8.   Tomorrow, you may continue your normal daily activities.     9.   Pressure dressing on 10 - 15 minutes. If swelling or bleeding occurs when removed, continue placing direct pressure on injection site for another 5 minutes, or until bleeding stops.     Alprazolam (XANAX) tablet  What is this medicine?  You were prescribed ALPRAZOLAM (al PRAY ginny conley) for the procedure you had today. This medication is a benzodiazepine. It is used to treat anxiety and panic attacks.  This medicine may be used for other purposes; ask your health care provider or pharmacist if you have questions.  What side effects may I notice from receiving this medicine?  Side effects that you should report to your doctor or health care professional as soon as possible:  • allergic reactions like skin rash, itching or hives, swelling of the face, lips, or tongue  • confusion, forgetfulness  • depression  • difficulty sleeping  • difficulty speaking  • feeling faint or lightheaded, falls  • mood changes, excitability or aggressive behavior  • muscle cramps  • trouble passing urine or change in the amount of urine  • unusually weak or tired  Side effects  that usually do not require medical attention (report to your doctor or health care professional if they continue or are bothersome):  • changes in appetite  • change in sex drive or performance  This list may not describe all possible side effects. Call your doctor for medical advice about side effects. You may report side effects to FDA at 9-664-CBR-5712.          I have been informed of and understand the above discharge instructions.

## 2021-08-05 NOTE — PROGRESS NOTES
MRI NURSING NOTES:    Discussed DC instructions/restrictions c Pt re RN oral anxiolytic.  Pt agrees and wishes to move forward.

## 2021-11-29 ENCOUNTER — APPOINTMENT (OUTPATIENT)
Dept: ADMISSIONS | Facility: MEDICAL CENTER | Age: 61
End: 2021-11-29
Attending: ORTHOPAEDIC SURGERY
Payer: COMMERCIAL

## 2021-12-01 ENCOUNTER — PRE-ADMISSION TESTING (OUTPATIENT)
Dept: ADMISSIONS | Facility: MEDICAL CENTER | Age: 61
End: 2021-12-01
Attending: ORTHOPAEDIC SURGERY
Payer: COMMERCIAL

## 2021-12-01 ENCOUNTER — OFFICE VISIT (OUTPATIENT)
Dept: MEDICAL GROUP | Facility: MEDICAL CENTER | Age: 61
End: 2021-12-01
Payer: COMMERCIAL

## 2021-12-01 VITALS
HEIGHT: 70 IN | DIASTOLIC BLOOD PRESSURE: 84 MMHG | TEMPERATURE: 96.2 F | BODY MASS INDEX: 35.65 KG/M2 | SYSTOLIC BLOOD PRESSURE: 132 MMHG | WEIGHT: 249 LBS | HEART RATE: 80 BPM | OXYGEN SATURATION: 95 %

## 2021-12-01 DIAGNOSIS — E78.00 PURE HYPERCHOLESTEROLEMIA: ICD-10-CM

## 2021-12-01 DIAGNOSIS — Z11.59 NEED FOR HEPATITIS C SCREENING TEST: ICD-10-CM

## 2021-12-01 DIAGNOSIS — Z01.810 PRE-OPERATIVE CARDIOVASCULAR EXAMINATION: ICD-10-CM

## 2021-12-01 DIAGNOSIS — R01.1 CARDIAC MURMUR: ICD-10-CM

## 2021-12-01 DIAGNOSIS — I10 ESSENTIAL HYPERTENSION: ICD-10-CM

## 2021-12-01 DIAGNOSIS — Z01.812 PRE-OPERATIVE LABORATORY EXAMINATION: ICD-10-CM

## 2021-12-01 DIAGNOSIS — R73.01 IFG (IMPAIRED FASTING GLUCOSE): ICD-10-CM

## 2021-12-01 PROBLEM — Z96.641 HISTORY OF RIGHT HIP REPLACEMENT: Status: ACTIVE | Noted: 2020-02-06

## 2021-12-01 PROCEDURE — 99214 OFFICE O/P EST MOD 30 MIN: CPT | Performed by: INTERNAL MEDICINE

## 2021-12-01 RX ORDER — AMLODIPINE BESYLATE 5 MG/1
5 TABLET ORAL
Qty: 90 TABLET | Refills: 3 | Status: SHIPPED | OUTPATIENT
Start: 2021-12-01 | End: 2023-02-09

## 2021-12-01 RX ORDER — OXYCODONE HYDROCHLORIDE AND ACETAMINOPHEN 5; 325 MG/1; MG/1
TABLET ORAL
COMMUNITY
End: 2021-12-01

## 2021-12-01 RX ORDER — LOSARTAN POTASSIUM 50 MG/1
TABLET ORAL
COMMUNITY
End: 2021-12-01

## 2021-12-01 RX ORDER — TERBINAFINE HYDROCHLORIDE 250 MG/1
TABLET ORAL
COMMUNITY
End: 2021-12-01

## 2021-12-01 ASSESSMENT — FIBROSIS 4 INDEX: FIB4 SCORE: 1.05

## 2021-12-01 NOTE — OR NURSING
"Preadmit appointment: \" Preparing for your Procedure information\" sheet given to patient with verbal and written instructions. Patient instructed to continue prescribed medications through the day before surgery, instructed to take the following medications the day of surgery per anesthesia protocol: NORVASC            Verbal and written, and pre-admit video website instructions provided on covid symptoms to watch for given to patient, pt advised to notify MD if any symptoms develop. Verbal instructions given to follow the [place] mask mandate and encouraged to avoid crowds. Also verbally instructed to wear a mask when with person known not to have had the Covid vaccine.    METs => 5    "

## 2021-12-01 NOTE — OR NURSING
"Preadmit appointment: \" Preparing for your Procedure information\" sheet given to patient with verbal and written instructions. Patient instructed to continue prescribed medications through the day before surgery, instructed to take the following medications the day of surgery per anesthesia protocol: NORVASC            Verbal and written, and pre-admit video website instructions provided on covid symptoms to watch for given to patient, pt advised to notify MD if any symptoms develop. Verbal instructions given to follow the [place] mask mandate and encouraged to avoid crowds. Also verbally instructed to wear a mask when with person known not to have had the Covid vaccine.    METs => 5    Bloodless order inplace in Epic and as an allergy. Advance Directives in Epic media. NO BLOOD sticker and armband on chart to be sent to pre-op. Notified Dr. Maya via FAX and SERGIO cisneros/Audrey. Pt interview via phone and consent on chart to pre-op  "

## 2021-12-02 NOTE — PROGRESS NOTES
New Patient to Establish      CC:  Establish, review of chronic medical problems    HPI:   Smith is a 61 y.o. male who came into clinic for above.    His provider left, so he is here to establish.  He is generally healthy, keeps a healthy diet and has a very active lifestyle.     He was started on amlodipine 5 mg daily for high blood pressure.  He is currently taking it irregularly slightly more than 50% of the time and watching BP regularly. It is not going up above target.     He is having right shoulder surgery soon and wondering if he needs EKG. He can 'run up and down' a flight of stairs without any issues.    ROS:   As above    Patient Active Problem List    Diagnosis Date Noted   • Pure hypercholesterolemia 07/01/2021   • Seasonal allergies 06/10/2021   • Carpal tunnel syndrome, left 06/10/2021   • Essential hypertension 06/10/2021   • Onychomycosis 03/11/2020   • S/P hip replacement, left 02/06/2020   • Cardiac murmur 02/06/2020   • Obesity, Class II, BMI 35-39.9, isolated (see actual BMI) 02/06/2020   • History of right hip replacement 02/06/2020       Past Medical History:   Diagnosis Date   • Arthritis     Hips   • Cancer (HCC)     Basal cell   • Cataract     Bilateral IOL   • Heart murmur     mild not an issue   • High cholesterol    • Hypertension        Current Outpatient Medications   Medication Sig Dispense Refill   • amLODIPine (NORVASC) 5 MG Tab Take 1 Tablet by mouth at bedtime. 90 Tablet 3   • ibuprofen (MOTRIN) 200 MG Tab Take 800 mg by mouth every 6 hours as needed.       No current facility-administered medications for this visit.       Allergies as of 12/01/2021 - Reviewed 12/01/2021   Allergen Reaction Noted   • Bloodless  07/07/2020       Social History     Socioeconomic History   • Marital status:      Spouse name: Not on file   • Number of children: Not on file   • Years of education: Not on file   • Highest education level: Not on file   Occupational History   • Not on file    Tobacco Use   • Smoking status: Never Smoker   • Smokeless tobacco: Never Used   Vaping Use   • Vaping Use: Never used   Substance and Sexual Activity   • Alcohol use: Yes     Comment: 1-2 drinks cocktails per day   • Drug use: No   • Sexual activity: Yes     Partners: Female     Comment: , 1 son   Other Topics Concern   • Not on file   Social History Narrative    Construction business     Social Determinants of Health     Financial Resource Strain:    • Difficulty of Paying Living Expenses: Not on file   Food Insecurity:    • Worried About Running Out of Food in the Last Year: Not on file   • Ran Out of Food in the Last Year: Not on file   Transportation Needs:    • Lack of Transportation (Medical): Not on file   • Lack of Transportation (Non-Medical): Not on file   Physical Activity:    • Days of Exercise per Week: Not on file   • Minutes of Exercise per Session: Not on file   Stress:    • Feeling of Stress : Not on file   Social Connections:    • Frequency of Communication with Friends and Family: Not on file   • Frequency of Social Gatherings with Friends and Family: Not on file   • Attends Christianity Services: Not on file   • Active Member of Clubs or Organizations: Not on file   • Attends Club or Organization Meetings: Not on file   • Marital Status: Not on file   Intimate Partner Violence:    • Fear of Current or Ex-Partner: Not on file   • Emotionally Abused: Not on file   • Physically Abused: Not on file   • Sexually Abused: Not on file   Housing Stability:    • Unable to Pay for Housing in the Last Year: Not on file   • Number of Places Lived in the Last Year: Not on file   • Unstable Housing in the Last Year: Not on file       Family History   Problem Relation Age of Onset   • Stroke Mother         65 y.o.   • Cancer Mother         Breast   • Diabetes Father    • Heart Disease Father 81        stents x 6   • Psychiatric Illness Brother    • Alcohol abuse Brother    • Drug abuse Brother        Past  "Surgical History:   Procedure Laterality Date   • CARPAL TUNNEL RELEASE Left 2021    [facility]   • PB TOTAL HIP ARTHROPLASTY Right 2020    Procedure: ARTHROPLASTY, HIP, TOTAL - AND SALDANA;  Surgeon: Herber Maya M.D.;  Location: SURGERY [facility];  Service: Orthopedics   • HIP REPLACEMENT, TOTAL      Dr. Maya   • CATARACT EXTRACTION WITH IOL Bilateral          /84 (BP Location: Right arm, Patient Position: Sitting, BP Cuff Size: Large adult long)   Pulse 80   Temp (!) 35.7 °C (96.2 °F) (Temporal)   Ht 1.778 m (5' 10\")   Wt 113 kg (249 lb)   SpO2 95%   BMI 35.73 kg/m²     Physical Exam  General: Alert and oriented, No apparent distress.  Eyes: Pupils are equal and reactive. No scleral icterus.  Neck: Supple. No cervical or supraclavicular lymphadenopathy noted. Thyroid not enlarged.  Lungs: Clear to auscultation bilaterally without any wheezing, crepitations.  Cardiovascular: Regular rate and rhythm. Low grade murmur in A and T.  Abdomen: Bowel sound +, soft, non tender, no rebound or guarding, no palpable organomegaly  Extremities: No clubbing, cyanosis, edema.       Assessment and Plan    1. IFG (impaired fasting glucose)  - check HEMOGLOBIN A1C; Future    2. Pure hypercholesterolemia  - monitor Lipid Profile; Future    3. Essential hypertension  - continue to monitor at home. Unclear if he benefits from daily medication or every other day.  - he is interested in testosterone replacement to keep up with physical performance. His testosterone was low normal this year. Will not recommend at this time, but we can reevaluate later. BP will need to be under control.    4. Need for hepatitis C screening test  - HCV Scrn ( [id number] 1xLife); Future    5. Cardiac murmur   Low grade murmur in A and T.  - no symptoms with highly intense physical activity. Present since childhood. Clinically monitor.     Followup: 6 M to 1 yr.      Signed by: Oumou Troncoso M.D.  "

## 2021-12-10 ENCOUNTER — HOSPITAL ENCOUNTER (OUTPATIENT)
Dept: LAB | Facility: MEDICAL CENTER | Age: 61
End: 2021-12-10
Attending: INTERNAL MEDICINE
Payer: COMMERCIAL

## 2021-12-10 ENCOUNTER — PRE-ADMISSION TESTING (OUTPATIENT)
Dept: ADMISSIONS | Facility: MEDICAL CENTER | Age: 61
End: 2021-12-10
Attending: ORTHOPAEDIC SURGERY
Payer: COMMERCIAL

## 2021-12-10 DIAGNOSIS — R73.01 IFG (IMPAIRED FASTING GLUCOSE): ICD-10-CM

## 2021-12-10 DIAGNOSIS — E78.00 PURE HYPERCHOLESTEROLEMIA: ICD-10-CM

## 2021-12-10 DIAGNOSIS — Z01.810 PRE-OPERATIVE CARDIOVASCULAR EXAMINATION: ICD-10-CM

## 2021-12-10 DIAGNOSIS — Z01.812 PRE-OPERATIVE LABORATORY EXAMINATION: ICD-10-CM

## 2021-12-10 DIAGNOSIS — Z11.59 NEED FOR HEPATITIS C SCREENING TEST: ICD-10-CM

## 2021-12-10 LAB
ANION GAP SERPL CALC-SCNC: 9 MMOL/L (ref 7–16)
BUN SERPL-MCNC: 17 MG/DL (ref 8–22)
CALCIUM SERPL-MCNC: 9.3 MG/DL (ref 8.4–10.2)
CHLORIDE SERPL-SCNC: 102 MMOL/L (ref 96–112)
CHOLEST SERPL-MCNC: 238 MG/DL (ref 100–199)
CO2 SERPL-SCNC: 25 MMOL/L (ref 20–33)
CREAT SERPL-MCNC: 0.79 MG/DL (ref 0.5–1.4)
EKG IMPRESSION: NORMAL
ERYTHROCYTE [DISTWIDTH] IN BLOOD BY AUTOMATED COUNT: 41.9 FL (ref 35.9–50)
EST. AVERAGE GLUCOSE BLD GHB EST-MCNC: 111 MG/DL
GLUCOSE SERPL-MCNC: 108 MG/DL (ref 65–99)
HBA1C MFR BLD: 5.5 % (ref 4–5.6)
HCT VFR BLD AUTO: 48.6 % (ref 42–52)
HCV AB SER QL: NORMAL
HDLC SERPL-MCNC: 61 MG/DL
HGB BLD-MCNC: 16.4 G/DL (ref 14–18)
LDLC SERPL CALC-MCNC: 163 MG/DL
MCH RBC QN AUTO: 30.4 PG (ref 27–33)
MCHC RBC AUTO-ENTMCNC: 33.7 G/DL (ref 33.7–35.3)
MCV RBC AUTO: 90.2 FL (ref 81.4–97.8)
PLATELET # BLD AUTO: 262 K/UL (ref 164–446)
PMV BLD AUTO: 10.5 FL (ref 9–12.9)
POTASSIUM SERPL-SCNC: 4.4 MMOL/L (ref 3.6–5.5)
RBC # BLD AUTO: 5.39 M/UL (ref 4.7–6.1)
SARS-COV-2 RNA RESP QL NAA+PROBE: NOTDETECTED
SODIUM SERPL-SCNC: 136 MMOL/L (ref 135–145)
SPECIMEN SOURCE: NORMAL
TRIGL SERPL-MCNC: 71 MG/DL (ref 0–149)
WBC # BLD AUTO: 6.6 K/UL (ref 4.8–10.8)

## 2021-12-10 PROCEDURE — 93005 ELECTROCARDIOGRAM TRACING: CPT

## 2021-12-10 PROCEDURE — U0005 INFEC AGEN DETEC AMPLI PROBE: HCPCS

## 2021-12-10 PROCEDURE — 36415 COLL VENOUS BLD VENIPUNCTURE: CPT

## 2021-12-10 PROCEDURE — 80048 BASIC METABOLIC PNL TOTAL CA: CPT

## 2021-12-10 PROCEDURE — 85027 COMPLETE CBC AUTOMATED: CPT

## 2021-12-10 PROCEDURE — 83036 HEMOGLOBIN GLYCOSYLATED A1C: CPT

## 2021-12-10 PROCEDURE — U0003 INFECTIOUS AGENT DETECTION BY NUCLEIC ACID (DNA OR RNA); SEVERE ACUTE RESPIRATORY SYNDROME CORONAVIRUS 2 (SARS-COV-2) (CORONAVIRUS DISEASE [COVID-19]), AMPLIFIED PROBE TECHNIQUE, MAKING USE OF HIGH THROUGHPUT TECHNOLOGIES AS DESCRIBED BY CMS-2020-01-R: HCPCS

## 2021-12-10 PROCEDURE — G0472 HEP C SCREEN HIGH RISK/OTHER: HCPCS

## 2021-12-10 PROCEDURE — C9803 HOPD COVID-19 SPEC COLLECT: HCPCS

## 2021-12-10 PROCEDURE — 93010 ELECTROCARDIOGRAM REPORT: CPT | Performed by: INTERNAL MEDICINE

## 2021-12-10 PROCEDURE — 80061 LIPID PANEL: CPT

## 2021-12-10 NOTE — OR NURSING
I don’t see significant differences between the two ECG’s. In my opinion, it’s OK to proceed as long as patient’s functional capacity is greater than 4 MET‘s. Thank you.  Antoni Cavazos M.D.  [facility]  Per nurses's notes METS >5  Emailed Dr. Maya's office that ECG reviewed by Dr. Cavazos and ok to proceed

## 2021-12-13 ENCOUNTER — ANESTHESIA EVENT (OUTPATIENT)
Dept: SURGERY | Facility: MEDICAL CENTER | Age: 61
End: 2021-12-13
Payer: COMMERCIAL

## 2021-12-13 ENCOUNTER — ANESTHESIA (OUTPATIENT)
Dept: SURGERY | Facility: MEDICAL CENTER | Age: 61
End: 2021-12-13
Payer: COMMERCIAL

## 2021-12-13 ENCOUNTER — HOSPITAL ENCOUNTER (OUTPATIENT)
Facility: MEDICAL CENTER | Age: 61
End: 2021-12-13
Attending: ORTHOPAEDIC SURGERY | Admitting: ORTHOPAEDIC SURGERY
Payer: COMMERCIAL

## 2021-12-13 VITALS
RESPIRATION RATE: 16 BRPM | HEIGHT: 70 IN | HEART RATE: 65 BPM | BODY MASS INDEX: 35.38 KG/M2 | TEMPERATURE: 97.9 F | WEIGHT: 247.14 LBS | DIASTOLIC BLOOD PRESSURE: 83 MMHG | SYSTOLIC BLOOD PRESSURE: 128 MMHG | OXYGEN SATURATION: 94 %

## 2021-12-13 PROCEDURE — 500028 HCHG ARTHROWAND TURBOVAC 3.5/90 SUCT.: Performed by: ORTHOPAEDIC SURGERY

## 2021-12-13 PROCEDURE — 700111 HCHG RX REV CODE 636 W/ 250 OVERRIDE (IP): Performed by: ORTHOPAEDIC SURGERY

## 2021-12-13 PROCEDURE — 160009 HCHG ANES TIME/MIN: Performed by: ORTHOPAEDIC SURGERY

## 2021-12-13 PROCEDURE — 160036 HCHG PACU - EA ADDL 30 MINS PHASE I: Performed by: ORTHOPAEDIC SURGERY

## 2021-12-13 PROCEDURE — 500144 HCHG CANNULA KIT, UNIV GREY-SHOULDER: Performed by: ORTHOPAEDIC SURGERY

## 2021-12-13 PROCEDURE — 160025 RECOVERY II MINUTES (STATS): Performed by: ORTHOPAEDIC SURGERY

## 2021-12-13 PROCEDURE — 160048 HCHG OR STATISTICAL LEVEL 1-5: Performed by: ORTHOPAEDIC SURGERY

## 2021-12-13 PROCEDURE — 700111 HCHG RX REV CODE 636 W/ 250 OVERRIDE (IP): Performed by: ANESTHESIOLOGY

## 2021-12-13 PROCEDURE — 700102 HCHG RX REV CODE 250 W/ 637 OVERRIDE(OP): Performed by: ANESTHESIOLOGY

## 2021-12-13 PROCEDURE — 700101 HCHG RX REV CODE 250: Performed by: ANESTHESIOLOGY

## 2021-12-13 PROCEDURE — 501838 HCHG SUTURE GENERAL: Performed by: ORTHOPAEDIC SURGERY

## 2021-12-13 PROCEDURE — 160002 HCHG RECOVERY MINUTES (STAT): Performed by: ORTHOPAEDIC SURGERY

## 2021-12-13 PROCEDURE — 160029 HCHG SURGERY MINUTES - 1ST 30 MINS LEVEL 4: Performed by: ORTHOPAEDIC SURGERY

## 2021-12-13 PROCEDURE — 160035 HCHG PACU - 1ST 60 MINS PHASE I: Performed by: ORTHOPAEDIC SURGERY

## 2021-12-13 PROCEDURE — 502581 HCHG PACK, SHOULDER ARTHROSCOPY: Performed by: ORTHOPAEDIC SURGERY

## 2021-12-13 PROCEDURE — 700105 HCHG RX REV CODE 258: Performed by: ORTHOPAEDIC SURGERY

## 2021-12-13 PROCEDURE — 160041 HCHG SURGERY MINUTES - EA ADDL 1 MIN LEVEL 4: Performed by: ORTHOPAEDIC SURGERY

## 2021-12-13 PROCEDURE — 700101 HCHG RX REV CODE 250: Performed by: ORTHOPAEDIC SURGERY

## 2021-12-13 PROCEDURE — A9270 NON-COVERED ITEM OR SERVICE: HCPCS | Performed by: ANESTHESIOLOGY

## 2021-12-13 PROCEDURE — 160046 HCHG PACU - 1ST 60 MINS PHASE II: Performed by: ORTHOPAEDIC SURGERY

## 2021-12-13 RX ORDER — CLINDAMYCIN PHOSPHATE 150 MG/ML
INJECTION, SOLUTION INTRAVENOUS PRN
Status: DISCONTINUED | OUTPATIENT
Start: 2021-12-13 | End: 2021-12-13 | Stop reason: SURG

## 2021-12-13 RX ORDER — LIDOCAINE HYDROCHLORIDE 20 MG/ML
INJECTION, SOLUTION EPIDURAL; INFILTRATION; INTRACAUDAL; PERINEURAL PRN
Status: DISCONTINUED | OUTPATIENT
Start: 2021-12-13 | End: 2021-12-13 | Stop reason: SURG

## 2021-12-13 RX ORDER — SODIUM CHLORIDE, SODIUM LACTATE, POTASSIUM CHLORIDE, CALCIUM CHLORIDE 600; 310; 30; 20 MG/100ML; MG/100ML; MG/100ML; MG/100ML
INJECTION, SOLUTION INTRAVENOUS CONTINUOUS
Status: ACTIVE | OUTPATIENT
Start: 2021-12-13 | End: 2021-12-13

## 2021-12-13 RX ORDER — DEXAMETHASONE SODIUM PHOSPHATE 4 MG/ML
INJECTION, SOLUTION INTRA-ARTICULAR; INTRALESIONAL; INTRAMUSCULAR; INTRAVENOUS; SOFT TISSUE PRN
Status: DISCONTINUED | OUTPATIENT
Start: 2021-12-13 | End: 2021-12-13 | Stop reason: SURG

## 2021-12-13 RX ORDER — MEPERIDINE HYDROCHLORIDE 25 MG/ML
6.25 INJECTION INTRAMUSCULAR; INTRAVENOUS; SUBCUTANEOUS
Status: DISCONTINUED | OUTPATIENT
Start: 2021-12-13 | End: 2021-12-13 | Stop reason: HOSPADM

## 2021-12-13 RX ORDER — HYDROMORPHONE HYDROCHLORIDE 2 MG/ML
0.4 INJECTION, SOLUTION INTRAMUSCULAR; INTRAVENOUS; SUBCUTANEOUS
Status: DISCONTINUED | OUTPATIENT
Start: 2021-12-13 | End: 2021-12-13 | Stop reason: HOSPADM

## 2021-12-13 RX ORDER — HYDRALAZINE HYDROCHLORIDE 20 MG/ML
5 INJECTION INTRAMUSCULAR; INTRAVENOUS
Status: DISCONTINUED | OUTPATIENT
Start: 2021-12-13 | End: 2021-12-13 | Stop reason: HOSPADM

## 2021-12-13 RX ORDER — KETOROLAC TROMETHAMINE 30 MG/ML
INJECTION, SOLUTION INTRAMUSCULAR; INTRAVENOUS PRN
Status: DISCONTINUED | OUTPATIENT
Start: 2021-12-13 | End: 2021-12-13 | Stop reason: SURG

## 2021-12-13 RX ORDER — ONDANSETRON 2 MG/ML
4 INJECTION INTRAMUSCULAR; INTRAVENOUS
Status: COMPLETED | OUTPATIENT
Start: 2021-12-13 | End: 2021-12-13

## 2021-12-13 RX ORDER — OXYCODONE HCL 5 MG/5 ML
10 SOLUTION, ORAL ORAL
Status: COMPLETED | OUTPATIENT
Start: 2021-12-13 | End: 2021-12-13

## 2021-12-13 RX ORDER — SODIUM CHLORIDE, SODIUM LACTATE, POTASSIUM CHLORIDE, CALCIUM CHLORIDE 600; 310; 30; 20 MG/100ML; MG/100ML; MG/100ML; MG/100ML
INJECTION, SOLUTION INTRAVENOUS CONTINUOUS
Status: DISCONTINUED | OUTPATIENT
Start: 2021-12-13 | End: 2021-12-13 | Stop reason: HOSPADM

## 2021-12-13 RX ORDER — MORPHINE SULFATE 10 MG/ML
INJECTION, SOLUTION INTRAMUSCULAR; INTRAVENOUS
Status: DISCONTINUED | OUTPATIENT
Start: 2021-12-13 | End: 2021-12-13 | Stop reason: HOSPADM

## 2021-12-13 RX ORDER — ONDANSETRON 2 MG/ML
INJECTION INTRAMUSCULAR; INTRAVENOUS PRN
Status: DISCONTINUED | OUTPATIENT
Start: 2021-12-13 | End: 2021-12-13 | Stop reason: SURG

## 2021-12-13 RX ORDER — OXYCODONE HCL 5 MG/5 ML
5 SOLUTION, ORAL ORAL
Status: COMPLETED | OUTPATIENT
Start: 2021-12-13 | End: 2021-12-13

## 2021-12-13 RX ORDER — HALOPERIDOL 5 MG/ML
1 INJECTION INTRAMUSCULAR
Status: DISCONTINUED | OUTPATIENT
Start: 2021-12-13 | End: 2021-12-13 | Stop reason: HOSPADM

## 2021-12-13 RX ORDER — SODIUM CHLORIDE 9 MG/ML
INJECTION, SOLUTION INTRAMUSCULAR; INTRAVENOUS; SUBCUTANEOUS
Status: DISCONTINUED | OUTPATIENT
Start: 2021-12-13 | End: 2021-12-13 | Stop reason: HOSPADM

## 2021-12-13 RX ORDER — HYDROMORPHONE HYDROCHLORIDE 2 MG/ML
0.1 INJECTION, SOLUTION INTRAMUSCULAR; INTRAVENOUS; SUBCUTANEOUS
Status: DISCONTINUED | OUTPATIENT
Start: 2021-12-13 | End: 2021-12-13 | Stop reason: HOSPADM

## 2021-12-13 RX ORDER — LABETALOL HYDROCHLORIDE 5 MG/ML
5 INJECTION, SOLUTION INTRAVENOUS
Status: DISCONTINUED | OUTPATIENT
Start: 2021-12-13 | End: 2021-12-13 | Stop reason: HOSPADM

## 2021-12-13 RX ORDER — TRANEXAMIC ACID 100 MG/ML
INJECTION, SOLUTION INTRAVENOUS PRN
Status: DISCONTINUED | OUTPATIENT
Start: 2021-12-13 | End: 2021-12-13 | Stop reason: SURG

## 2021-12-13 RX ORDER — HYDROMORPHONE HYDROCHLORIDE 2 MG/ML
0.2 INJECTION, SOLUTION INTRAMUSCULAR; INTRAVENOUS; SUBCUTANEOUS
Status: DISCONTINUED | OUTPATIENT
Start: 2021-12-13 | End: 2021-12-13 | Stop reason: HOSPADM

## 2021-12-13 RX ORDER — DIPHENHYDRAMINE HYDROCHLORIDE 50 MG/ML
12.5 INJECTION INTRAMUSCULAR; INTRAVENOUS
Status: DISCONTINUED | OUTPATIENT
Start: 2021-12-13 | End: 2021-12-13 | Stop reason: HOSPADM

## 2021-12-13 RX ORDER — ROPIVACAINE HYDROCHLORIDE 5 MG/ML
INJECTION, SOLUTION EPIDURAL; INFILTRATION; PERINEURAL
Status: DISCONTINUED | OUTPATIENT
Start: 2021-12-13 | End: 2021-12-13 | Stop reason: HOSPADM

## 2021-12-13 RX ORDER — KETOROLAC TROMETHAMINE 30 MG/ML
INJECTION, SOLUTION INTRAMUSCULAR; INTRAVENOUS
Status: DISCONTINUED | OUTPATIENT
Start: 2021-12-13 | End: 2021-12-13 | Stop reason: HOSPADM

## 2021-12-13 RX ORDER — CEFAZOLIN SODIUM 1 G/3ML
INJECTION, POWDER, FOR SOLUTION INTRAMUSCULAR; INTRAVENOUS PRN
Status: DISCONTINUED | OUTPATIENT
Start: 2021-12-13 | End: 2021-12-13 | Stop reason: SURG

## 2021-12-13 RX ADMIN — DEXAMETHASONE SODIUM PHOSPHATE 8 MG: 4 INJECTION, SOLUTION INTRAMUSCULAR; INTRAVENOUS at 16:18

## 2021-12-13 RX ADMIN — KETOROLAC TROMETHAMINE 15 MG: 30 INJECTION, SOLUTION INTRAMUSCULAR at 18:18

## 2021-12-13 RX ADMIN — CLINDAMYCIN PHOSPHATE 900 MG: 150 INJECTION, SOLUTION INTRAMUSCULAR; INTRAVENOUS at 16:18

## 2021-12-13 RX ADMIN — FENTANYL CITRATE 50 MCG: 50 INJECTION, SOLUTION INTRAMUSCULAR; INTRAVENOUS at 17:53

## 2021-12-13 RX ADMIN — OXYCODONE HYDROCHLORIDE 10 MG: 5 SOLUTION ORAL at 18:56

## 2021-12-13 RX ADMIN — FENTANYL CITRATE 50 MCG: 50 INJECTION, SOLUTION INTRAMUSCULAR; INTRAVENOUS at 16:40

## 2021-12-13 RX ADMIN — ONDANSETRON 4 MG: 2 INJECTION INTRAMUSCULAR; INTRAVENOUS at 17:45

## 2021-12-13 RX ADMIN — FENTANYL CITRATE 50 MCG: 50 INJECTION, SOLUTION INTRAMUSCULAR; INTRAVENOUS at 17:03

## 2021-12-13 RX ADMIN — SODIUM CHLORIDE, POTASSIUM CHLORIDE, SODIUM LACTATE AND CALCIUM CHLORIDE: 600; 310; 30; 20 INJECTION, SOLUTION INTRAVENOUS at 14:41

## 2021-12-13 RX ADMIN — FENTANYL CITRATE 50 MCG: 50 INJECTION, SOLUTION INTRAMUSCULAR; INTRAVENOUS at 18:56

## 2021-12-13 RX ADMIN — FENTANYL CITRATE 50 MCG: 50 INJECTION, SOLUTION INTRAMUSCULAR; INTRAVENOUS at 16:16

## 2021-12-13 RX ADMIN — PROPOFOL 200 MG: 10 INJECTION, EMULSION INTRAVENOUS at 16:16

## 2021-12-13 RX ADMIN — LIDOCAINE HYDROCHLORIDE 50 MG: 20 INJECTION, SOLUTION EPIDURAL; INFILTRATION; INTRACAUDAL; PERINEURAL at 16:16

## 2021-12-13 RX ADMIN — TRANEXAMIC ACID 1000 MG: 100 INJECTION, SOLUTION INTRAVENOUS at 17:45

## 2021-12-13 RX ADMIN — ONDANSETRON 4 MG: 2 INJECTION INTRAMUSCULAR; INTRAVENOUS at 18:56

## 2021-12-13 RX ADMIN — CEFAZOLIN 2 G: 330 INJECTION, POWDER, FOR SOLUTION INTRAMUSCULAR; INTRAVENOUS at 16:18

## 2021-12-13 ASSESSMENT — PAIN DESCRIPTION - PAIN TYPE
TYPE: ACUTE PAIN;SURGICAL PAIN
TYPE: ACUTE PAIN;CHRONIC PAIN;SURGICAL PAIN

## 2021-12-13 ASSESSMENT — FIBROSIS 4 INDEX: FIB4 SCORE: 1.01

## 2021-12-13 NOTE — ANESTHESIA PREPROCEDURE EVALUATION
Case: [id number] Date/Time: 12/13/21 1430    Procedures:       ARTHROSCOPY,SHOULDER,WITH EXTENSIVE DEBRIDEMENT (Right )      DECOMPRESSION, SHOULDER, ARTHROSCOPIC - MINI OPEN SUBACROMIAL AND LATERAL CORACOID      EXCISION, CLAVICLE, DISTAL      ARTHROSCOPY, SHOULDER, WITH ROTATOR CUFF REPAIR- AND REPAIR AS INDICATED    Pre-op diagnosis: COMPLETE ROTATOR CUFF TEAR OR RUPTURE OF RIGHT SHOULDER    Location:  OR  / SURGERY [facility]    Surgeons: Herber Maya M.D.          Relevant Problems   ANESTHESIA (within normal limits)      PULMONARY (within normal limits)      NEURO (within normal limits)      CARDIAC   (positive) Cardiac murmur   (positive) Essential hypertension      GI (within normal limits)       (within normal limits)      ENDO (within normal limits)      Other   (positive) Obesity, Class II, BMI 35-39.9, isolated (see actual BMI)       Physical Exam    Airway   Mallampati: II  TM distance: >3 FB  Neck ROM: full       Cardiovascular - normal exam  Rhythm: regular  Rate: normal  (-) murmur     Dental - normal exam           Pulmonary - normal exam  Breath sounds clear to auscultation     Abdominal    Neurological - normal exam                 Anesthesia Plan    ASA 2       Plan - general       Airway plan will be LMA          Induction: intravenous    Postoperative Plan: Postoperative administration of opioids is intended.    Pertinent diagnostic labs and testing reviewed    Informed Consent:    Anesthetic plan and risks discussed with patient.    Use of blood products discussed with: patient whom consented to blood products.

## 2021-12-14 ASSESSMENT — PAIN SCALES - GENERAL: PAIN_LEVEL: 3

## 2021-12-14 NOTE — OR NURSING
1910: Pt instructed to use incentive spirometer. Able to demonstrated proper use. Pt achieved over 2500  1927: Pt son updated via phone on progress, ETA for discharge, and POC  1932: meets criteria for stage 2.   1954: pain tolerable. No nausea. Dressing remains CDI. CMS intact. Pt to stage 2.

## 2021-12-14 NOTE — ANESTHESIA POSTPROCEDURE EVALUATION
Patient: Smith Gloria    Procedure Summary     Date: 12/13/21 Room / Location: [place] [id number] / SURGERY [facility]    Anesthesia Start: 1611 Anesthesia Stop: 1832    Procedures:       ARTHROSCOPY,SHOULDER,WITH EXTENSIVE DEBRIDEMENT, LOOSE BODY EXCISION (Right Shoulder)      DECOMPRESSION, SHOULDER, ARTHROSCOPIC - MINI OPEN SUBACROMIAL AND LATERAL CORACOID (Right Shoulder)      EXCISION, CLAVICLE, DISTAL (Right Shoulder)      ARTHROSCOPY, SHOULDER, WITH ROTATOR CUFF REPAIR, BICEPS TENODESIS (Right Shoulder) Diagnosis: (Right Shoulder Impingement With Rotator Cuff Tear, Subacromial Impingement & Acromioclavicular Arthropathy)    Surgeons: Herber Maya M.D. Responsible Provider: Calixto Rivera M.D.    Anesthesia Type: general ASA Status: 2          Final Anesthesia Type: general  Last vitals  BP   Blood Pressure: 128/83    Temp   36.6 °C (97.9 °F)    Pulse   65   Resp   16    SpO2   94 %      Anesthesia Post Evaluation    Patient location during evaluation: PACU  Patient participation: complete - patient participated  Level of consciousness: awake and alert  Pain score: 3    Airway patency: patent  Anesthetic complications: no  Cardiovascular status: hemodynamically stable  Respiratory status: acceptable  Hydration status: euvolemic    PONV: none          No complications documented.     Nurse Pain Score: 3 (NPRS)

## 2021-12-14 NOTE — OR NURSING
1830: To PACU from OR via gurney, sleeping, respirations spontaneous and non-labored via OPA. Oxygen at 6 lpm via mask, dressing on Right Shoulder with Immobilizer. Plan to keep pt in PACU for full hour per STOPBANG (Score of 5) protocol.    1837: OPA DC'd     1845: Patient waking and coughing a little, he denies nausea and pain is at 7/10. Plan to medicate per MAR.    1900: Patient resting, pain is lessening, no number value given. Voice mail message left.     1905: Patient care to RN Em.

## 2021-12-14 NOTE — ANESTHESIA PROCEDURE NOTES
Airway    Date/Time: 12/13/2021 4:17 PM  Performed by: Calixto Rivera M.D.  Authorized by: Calixto Rivera M.D.     Location:  OR  Urgency:  Elective  Indications for Airway Management:  Anesthesia      Spontaneous Ventilation: absent    Sedation Level:  Deep  Preoxygenated: Yes    Final Airway Type:  Supraglottic airway  Final Supraglottic Airway:  Standard LMA    SGA Size:  4  Number of Attempts at Approach:  1

## 2021-12-14 NOTE — ANESTHESIA TIME REPORT
Anesthesia Start and Stop Event Times     Date Time Event    12/13/2021 1450 Ready for Procedure     1611 Anesthesia Start     1832 Anesthesia Stop        Responsible Staff  12/13/21    Name Role Begin End    Calixto Rivera M.D. Anesth 1611 1832        Preop Diagnosis (Free Text):  Pre-op Diagnosis     Right Shoulder Impingement With Rotator Cuff Tear, Subacromial Impingement & Acromioclavicular Arthropathy        Preop Diagnosis (Codes):    Premium Reason  A. 3PM - 7AM    Comments:

## 2021-12-14 NOTE — DISCHARGE INSTRUCTIONS
ACTIVITY: Rest and take it easy for the first 24 hours.  A responsible adult is recommended to remain with you during that time.  It is normal to feel sleepy.  We encourage you to not do anything that requires balance, judgment or coordination.    MILD FLU-LIKE SYMPTOMS ARE NORMAL. YOU MAY EXPERIENCE GENERALIZED MUSCLE ACHES, THROAT IRRITATION, HEADACHE AND/OR SOME NAUSEA.    FOR 24 HOURS DO NOT:  Drive, operate machinery or run household appliances.  Drink beer or alcoholic beverages.   Make important decisions or sign legal documents.    SPECIAL INSTRUCTIONS:   Incentive spirometer 10 times every hour while awake  Non weight bearing to the right upper extremity    DIET: To avoid nausea, slowly advance diet as tolerated, avoiding spicy or greasy foods for the first day.  Add more substantial food to your diet according to your physician's instructions.    INCREASE FLUIDS AND FIBER TO AVOID CONSTIPATION.    SURGICAL DRESSING/BATHING: Keep dressing clean, dry and intact until instructed otherwise by surgeon    FOLLOW-UP APPOINTMENT: A follow-up appointment should be arranged with your doctor, call to schedule [phone]    You should CALL YOUR PHYSICIAN if you develop:  Fever greater than 101 degrees F.  Pain not relieved by medication, or persistent nausea or vomiting.  Excessive bleeding (blood soaking through dressing) or unexpected drainage from the wound.  Extreme redness or swelling around the incision site, drainage of pus or foul smelling drainage.  Inability to urinate or empty your bladder within 8 hours.  Problems with breathing or chest pain.    You should call 911 if you develop problems with breathing or chest pain.  If you are unable to contact your doctor or surgical center, you should go to the nearest emergency room or urgent care center.  Physician's telephone #: [phone]    If any questions arise, call your doctor.  If your doctor is not available, please feel free to call the Surgical  Center at [phone].     A registered nurse may call you a few days after your surgery to see how you are doing after your procedure.    MEDICATIONS: Resume taking daily medication.  Take prescribed pain medication with food.  If no medication is prescribed, you may take non-aspirin pain medication if needed.  PAIN MEDICATION CAN BE VERY CONSTIPATING.  Take a stool softener or laxative such as senokot, pericolace, or milk of magnesia if needed.    Prescription given for N/A.  Last pain medication given at Oxycodone 5mg at 6:58pm.    If your physician has prescribed pain medication that includes Acetaminophen (Tylenol), do not take additional Acetaminophen (Tylenol) while taking the prescribed medication.    Depression / Suicide Risk    As you are discharged from this [facility] facility, it is important to learn how to keep safe from harming yourself.    Recognize the warning signs:  · Abrupt changes in personality, positive or negative- including increase in energy   · Giving away possessions  · Change in eating patterns- significant weight changes-  positive or negative  · Change in sleeping patterns- unable to sleep or sleeping all the time   · Unwillingness or inability to communicate  · Depression  · Unusual sadness, discouragement and loneliness  · Talk of wanting to die  · Neglect of personal appearance   · Rebelliousness- reckless behavior  · Withdrawal from people/activities they love  · Confusion- inability to concentrate     If you or a loved one observes any of these behaviors or has concerns about self-harm, here's what you can do:  · Talk about it- your feelings and reasons for harming yourself  · Remove any means that you might use to hurt yourself (examples: pills, rope, extension cords, firearm)  · Get professional help from the community (Mental Health, Substance Abuse, psychological counseling)  · Do not be alone:Call your Safe Contact- someone whom you trust who will be there for  you.  · Call your local CRISIS HOTLINE 914-7805 or 293-402-5840  · Call your local Children's Mobile Crisis Response Team [place] (025) 672-0044 or www.[employer]  · Call the toll free National Suicide Prevention Hotlines   · National Suicide Prevention Lifeline 543-975-ODWH (6708)  · [facility] Line Network 800-SUICIDE (352-2456)    Discharge Education for patients on ELEAZAR (Obstructive Sleep Apnea) Protocol    Prior to receiving sedation or anesthesia, we screen all patients for Obstructive Sleep Apnea.  During your screening, you were identified as having suspected, but not confirmed Obstructive Sleep Apnea(ELEAZAR).    What is Obstructive Sleep Apnea?  Sleep apnea (AP-ne-ah) is a common disorder which involves breathing pauses that occur during sleep.  These can last from 10 seconds to a minute or longer.  Normal breathing resumes often with a loud snort or choking sound.    Sleep apnea occurs in all age groups and both genders but is more common in men and people over 40 years of age.  It has been estimated that as many as 18 million Americans have sleep apnea.  Most people who have sleep apnea don’t know they have it because it only occurs during sleep.  A family member and/or bed partner may first notice the signs of sleep apnea.  Sleep apnea is a chronic (ongoing) condition that disrupts the quality and quantity of your sleep repeatedly throughout the night.  This often results in excessive daytime sleepiness or fatigue during the day.  It may also contribute to high blood pressure, heart problems, and complications following medications used for surgery and procedures.    To establish a definitive diagnosis, further testing from a specialist would be needed.  We recommend that you follow up with your primary care physician.    We recommend that you should be with an adult observer for at least 24 hours after your sedation/anesthesia.  If you have a CPAP machine, you should wear it during any sleep  period (day or night) for the week following your procedure.  We encourage you to sleep on your side or in a sitting position, even with napping.  Lying flat on your back increases the risk of apnea and airway obstruction during your post procedure recovery period.    It is important to prevent over-sedation that could increase your risk for apnea.  Please take all pain medication as directed by your physician.  If you are not getting pain relief, please contact your physician to discuss possible approaches to relieving pain while minimizing medications that can affect your breathing and oxygen levels.        How To Use an Incentive Spirometer  An incentive spirometer is a tool that measures how well you are filling your lungs with each breath. Learning to take long, deep breaths using this tool can help you keep your lungs clear and active. This may help to reverse or lessen your chance of developing breathing (pulmonary) problems, especially infection. You may be asked to use a spirometer:  · After a surgery.  · If you have a lung problem or a history of smoking.  · After a long period of time when you have been unable to move or be active.  If the spirometer includes an indicator to show the highest number that you have reached, your health care provider or respiratory therapist will help you set a goal. Keep a list (log) of your progress as told by your health care provider.  What are the risks?  · Breathing too quickly may cause dizziness or cause you to pass out. Take your time so you do not get dizzy or light-headed.  · If you are in pain, you may need to take pain medicine before doing incentive spirometry. It is harder to take a deep breath if you are having pain.  How to use your incentive spirometer    1. Sit up on the edge of your bed or on a chair.  2. Hold the incentive spirometer so that it is in an upright position.  3. Before you use the spirometer, breathe out normally.  4. Place the mouthpiece in  your mouth. Make sure your lips are closed tightly around it.  5. Breathe in slowly and as deeply as you can through your mouth, causing the piston or the ball to rise toward the top of the chamber.  6. Hold your breath for 3-5 seconds, or for as long as possible.  ? If the spirometer includes a  indicator, use this to guide you in breathing. Slow down your breathing if the indicator goes above the marked areas.  7. Remove the mouthpiece from your mouth and breathe out normally. The piston or ball will return to the bottom of the chamber.  8. Rest for a few seconds, then repeat the steps 10 or more times.  ? Take your time and take a few normal breaths between deep breaths so that you do not get dizzy or light-headed.  ? Do this every 1-2 hours when you are awake.  9. If the spirometer includes a goal marker to show the highest number you have reached (best effort), use this as a goal to work toward during each repetition.  10. After each set of 10 deep breaths, cough a few times. This will help to make sure that your lungs are clear.  ? If you have an incision on your chest or abdomen from surgery, place a pillow or a rolled-up towel firmly against the incision when you cough. This can help to reduce pain from coughing.  General tips  · When you become able to get out of bed, walk around often and continue to cough to help clear your lungs.  · Keep using the incentive spirometer until your health care provider says it is okay to stop using it. If you have been in the hospital, you may be told to keep using the spirometer at home.  Contact a health care provider if:  · You are having difficulty using the spirometer.  · You have trouble using the spirometer as often as instructed.  · Your pain medicine is not giving enough relief for you to use the spirometer as told.  · You have a fever.  · You develop shortness of breath.  Get help right away if:  · You develop a cough with bloody mucus from the lungs (bloody  sputum).  · You have fluid or blood coming from an incision site after you cough.  Summary  · An incentive spirometer is a tool that can help you learn to take long, deep breaths to keep your lungs clear and active.  · You may be asked to use a spirometer after a surgery, if you have a lung problem or a history of smoking, or if you have been inactive for a long period of time.  · Use your incentive spirometer as instructed every 1-2 hours while you are awake.  · If you have an incision on your chest or abdomen, place a pillow or a rolled-up towel firmly against your incision when you cough. This will help to reduce pain.  This information is not intended to replace advice given to you by your health care provider. Make sure you discuss any questions you have with your health care provider.  Document Released: 04/29/2008 Document Revised: 01/10/2019 Document Reviewed: 10/31/2018  [employer] Patient Education © 2020 [employer]

## 2021-12-17 NOTE — OP REPORT
Date of Surgery:  12/17/2021    PREOPERATIVE DIAGNOSES   Right shoulder pain.  Right shoulder impingement.  Right shoulder acromioclavicular arthritis.  Right shoulder superior labral tear  Right shoulder coracohumeral impingement  Right shoulder massive rotator cuff tear     POSTOPERATIVE DIAGNOSES:  Right shoulder pain.  Right shoulder impingement.  Right shoulder acromioclavicular arthritis.  Right shoulder superior labral tear  Right shoulder coracohumeral impingement  Right shoulder massive rotator cuff tear    PROCEDURES:  Right shoulder arthroscopic extensive debridement of the labrum, glenoid chondroplasty and biceps and rotator cuff debridement  Right shoulder arthroscopic partial synovectomy.  Right shoulder open distal clavicle excision.  Right shoulder open subacromial decompression.  Right shoulder open biceps tenodesis.  Right shoulder rotator cuff repair, massive (with secondary difficulty)  Right shoulder arthroscopic lateral coracoid decompression      SURGEON:  Herber Maya MD.   ASSISTANT: DAVID Poole     ANESTHESIA:  General.     ANESTHESIOLOGIST:  No responsible provider has been recorded for the case.     FINDINGS:  Stable subacromial decompression.  Stable distal clavicle excision.  Stable arthroscopic labral debridement.  Noncompetent insertional tendinopathy of the biceps on the superior glenoid.  Stable biceps tenodesis.  Stable lateral coracoid decompression  Stable massive rotator cuff repair with involvement of the superior border of the subscap, supraspinatus, infraspinatus and upper border of the teres.  The footprint was approximately 2.5 cm on the greater tuberosity.    COMPLICATIONS:  None.       PROCEDURE IN DETAIL:  The patient was taken to the operating room where he    underwent general anesthetic and then was positioned on the beach-chair    apparatus.  All bony prominences padded.  Right upper extremity was isolated,    prepped using Hibiclens, alcohol,  ChloraPrep, draped using sterile technique.    Perioperative antibiotics were given.  An appropriate time-out was    undertaken.  The spider was utilized for stabilization.  Surgery initiated    with instilling 30 mL of saline at the joint and then placement of a posterior   and an anterior portal under direct localization.     Diagnostic arthroscopy showed grade 2 chondral injuries on the glenoid treated with shaver chondroplasty.  Similarly extensive labral injury and damage were noted and treated with shaver chondroplasty.  In addition extensive synovitic decompression and arthroscopic debridement of the biceps and rotator cuff were undertaken.  The remaining joint had intact articular surfaces.     The labrum had significant degenerative changes and therefore was treated with   a shaver chondroplasty and Oratec/Arthrocare stabilization.The superior labrum had a deficiency in it and was hypermobile secondary to  insertional tendonopathy.  For this reason, a decision was undertaken to tenotomize the biceps intraarticularly.  This was done and stabilized with the shaver and Arthrocare.  The rotator cuff had a massive rotator cuff injury.  The biceps had extensive injury and therefore as noted above was decompressed.  In addition there was significant subscapularis tendinopathy compatible with coracohumeral impingement.  For this reason the subperiosteal elevation of the posterior and lateral coracoid was undertaken by backing out the anterior cannula.    Lateral coracoid was then addressed using Oratec/Arthrocare and shaver chondroplasty as well as bony debridement..     At this point, the cannulas were removed.  The portals were closed using interrupted nylon and the shoulder addressed with a mini open approach.    Skin and subcutaneous tissue were incised.      Subperiosteal dissection of the distal clavicle was followed by resection of the distal 1 cm after elevation of the periosteum.  This was treated with  hemostasis, Bovie, periarticular anesthetization and then the fascia over this repaired after distal clavicle    excision using 0 Monocryl.     The deltoid was split in line with the anterolateral corner and subacromial    bursa was identified and excised.  The subacromial decompression had been accomplished with the undersurface decompression of the CA ligament insertion and then an osteotomy of the acromion using an oscillating saw and hand rasp.    The rotator cuff was assessed and noted to have a massive tear, this was greater than 2.5 cm..  This was treated with debridement, and katie decompression of the greater tuberosity.  A side to side repair was started with X braid suture.  Subsequent to this the decompression of the greater tuberosity was followed by passage of 3 separate suture repairs of the rotator cuff over bone bridges with modified michael rema stitch with #2 fiberwire over bone bridges.  Following this a speed bridge pattern was reinforced externally.     The biceps tendon was addressed through the bicipital groove with incision and   creation of a katie hole proximally.  The bicipital groove was burred as well.  The suture was passed over bone bridges anteriorly, stitched into the biceps   tendon with [place]ino whipstitch and then subsequent repair of the biceps into    the keyhole with a tenodesis and external oversew with #1 FiberWire.     Thorough irrigation was undertaken and anesthetization was undertaken using    ropivacaine, morphine and Toradol.  The patient was then placed into arm at    the side position and closure undertaken after hemostasis obtained.     The deltoid was repaired using 0 Monocryl.  The subcutaneous 0 and 3-0    Monocryl were utilized.  Steri-Strips were used on the skin.  The patient    placed into a sterile bandage, awoken from his anesthetic and taken to the    recovery room, tolerated the procedure very well with no signs of    complications.         ____________________________________     UZAIR MORGAN MD

## 2023-02-07 DIAGNOSIS — R73.01 IFG (IMPAIRED FASTING GLUCOSE): ICD-10-CM

## 2023-02-07 DIAGNOSIS — I10 ESSENTIAL HYPERTENSION: ICD-10-CM

## 2023-02-07 DIAGNOSIS — Z12.5 PROSTATE CANCER SCREENING: ICD-10-CM

## 2023-02-07 DIAGNOSIS — Z00.00 WELL ADULT EXAM: ICD-10-CM

## 2023-02-09 RX ORDER — AMLODIPINE BESYLATE 5 MG/1
5 TABLET ORAL
Qty: 30 TABLET | Refills: 0 | Status: SHIPPED | OUTPATIENT
Start: 2023-02-09

## 2023-02-22 ENCOUNTER — APPOINTMENT (OUTPATIENT)
Dept: MEDICAL GROUP | Facility: MEDICAL CENTER | Age: 63
End: 2023-02-22
Payer: COMMERCIAL

## 2023-02-24 ENCOUNTER — APPOINTMENT (OUTPATIENT)
Dept: MEDICAL GROUP | Facility: MEDICAL CENTER | Age: 63
End: 2023-02-24
Payer: COMMERCIAL

## 2024-02-29 ENCOUNTER — APPOINTMENT (RX ONLY)
Dept: URBAN - METROPOLITAN AREA CLINIC 4 | Facility: CLINIC | Age: 64
Setting detail: DERMATOLOGY
End: 2024-02-29

## 2024-02-29 DIAGNOSIS — L85.3 XEROSIS CUTIS: ICD-10-CM

## 2024-02-29 DIAGNOSIS — Z71.89 OTHER SPECIFIED COUNSELING: ICD-10-CM

## 2024-02-29 DIAGNOSIS — D18.0 HEMANGIOMA: ICD-10-CM

## 2024-02-29 DIAGNOSIS — L82.1 OTHER SEBORRHEIC KERATOSIS: ICD-10-CM

## 2024-02-29 DIAGNOSIS — L57.0 ACTINIC KERATOSIS: ICD-10-CM

## 2024-02-29 DIAGNOSIS — D22 MELANOCYTIC NEVI: ICD-10-CM

## 2024-02-29 DIAGNOSIS — L81.4 OTHER MELANIN HYPERPIGMENTATION: ICD-10-CM

## 2024-02-29 PROBLEM — D22.5 MELANOCYTIC NEVI OF TRUNK: Status: ACTIVE | Noted: 2024-02-29

## 2024-02-29 PROBLEM — D48.5 NEOPLASM OF UNCERTAIN BEHAVIOR OF SKIN: Status: ACTIVE | Noted: 2024-02-29

## 2024-02-29 PROBLEM — C44.612 BASAL CELL CARCINOMA OF SKIN OF RIGHT UPPER LIMB, INCLUDING SHOULDER: Status: ACTIVE | Noted: 2024-02-29

## 2024-02-29 PROBLEM — D18.01 HEMANGIOMA OF SKIN AND SUBCUTANEOUS TISSUE: Status: ACTIVE | Noted: 2024-02-29

## 2024-02-29 PROCEDURE — ? BIOPSY BY SHAVE METHOD

## 2024-02-29 PROCEDURE — 11102 TANGNTL BX SKIN SINGLE LES: CPT

## 2024-02-29 PROCEDURE — ? COUNSELING

## 2024-02-29 PROCEDURE — 11103 TANGNTL BX SKIN EA SEP/ADDL: CPT

## 2024-02-29 PROCEDURE — 17000 DESTRUCT PREMALG LESION: CPT | Mod: 59

## 2024-02-29 PROCEDURE — ? MEDICATION COUNSELING

## 2024-02-29 PROCEDURE — 17003 DESTRUCT PREMALG LES 2-14: CPT

## 2024-02-29 PROCEDURE — ? PRESCRIPTION

## 2024-02-29 PROCEDURE — 99203 OFFICE O/P NEW LOW 30 MIN: CPT | Mod: 25

## 2024-02-29 PROCEDURE — ? LIQUID NITROGEN

## 2024-02-29 ASSESSMENT — LOCATION DETAILED DESCRIPTION DERM
LOCATION DETAILED: MIDDLE STERNUM
LOCATION DETAILED: LEFT PROXIMAL DORSAL FOREARM
LOCATION DETAILED: UPPER STERNUM
LOCATION DETAILED: RIGHT MID-UPPER BACK
LOCATION DETAILED: LEFT MEDIAL SUPERIOR CHEST
LOCATION DETAILED: LEFT TRIANGULAR FOSSA
LOCATION DETAILED: RIGHT SUPERIOR MEDIAL UPPER BACK
LOCATION DETAILED: RIGHT INFERIOR UPPER BACK

## 2024-02-29 ASSESSMENT — LOCATION ZONE DERM
LOCATION ZONE: ARM
LOCATION ZONE: EAR
LOCATION ZONE: TRUNK

## 2024-02-29 ASSESSMENT — LOCATION SIMPLE DESCRIPTION DERM
LOCATION SIMPLE: CHEST
LOCATION SIMPLE: RIGHT UPPER BACK
LOCATION SIMPLE: LEFT EAR
LOCATION SIMPLE: LEFT FOREARM

## 2024-02-29 NOTE — PROCEDURE: MEDICATION COUNSELING
Acitretin Pregnancy And Lactation Text: This medication is Pregnancy Category X and should not be given to women who are pregnant or may become pregnant in the future. This medication is excreted in breast milk.
Olumiant Counseling: I discussed with the patient the risks of Olumiant therapy including but not limited to upper respiratory tract infections, shingles, cold sores, and nausea. Live vaccines should be avoided.  This medication has been linked to serious infections; higher rate of mortality; malignancy and lymphoproliferative disorders; major adverse cardiovascular events; thrombosis; gastrointestinal perforations; neutropenia; lymphopenia; anemia; liver enzyme elevations; and lipid elevations.
Enbrel Counseling:  I discussed with the patient the risks of etanercept including but not limited to myelosuppression, immunosuppression, autoimmune hepatitis, demyelinating diseases, lymphoma, and infections.  The patient understands that monitoring is required including a PPD at baseline and must alert us or the primary physician if symptoms of infection or other concerning signs are noted.
Hydroxychloroquine Pregnancy And Lactation Text: This medication has been shown to cause fetal harm but it isn't assigned a Pregnancy Risk Category. There are small amounts excreted in breast milk.
Oral Minoxidil Counseling- I discussed with the patient the risks of oral minoxidil including but not limited to shortness of breath, swelling of the feet or ankles, dizziness, lightheadedness, unwanted hair growth and allergic reaction.  The patient verbalized understanding of the proper use and possible adverse effects of oral minoxidil.  All of the patient's questions and concerns were addressed.
Birth Control Pills Counseling: Birth Control Pill Counseling: I discussed with the patient the potential side effects of OCPs including but not limited to increased risk of stroke, heart attack, thrombophlebitis, deep venous thrombosis, hepatic adenomas, breast changes, GI upset, headaches, and depression.  The patient verbalized understanding of the proper use and possible adverse effects of OCPs. All of the patient's questions and concerns were addressed.
Tazorac Pregnancy And Lactation Text: This medication is not safe during pregnancy. It is unknown if this medication is excreted in breast milk.
Sarecycline Pregnancy And Lactation Text: This medication is Pregnancy Category D and not consider safe during pregnancy. It is also excreted in breast milk.
Rituxan Pregnancy And Lactation Text: This medication is Pregnancy Category C and it isn't know if it is safe during pregnancy. It is unknown if this medication is excreted in breast milk but similar antibodies are known to be excreted.
Cyclophosphamide Pregnancy And Lactation Text: This medication is Pregnancy Category D and it isn't considered safe during pregnancy. This medication is excreted in breast milk.
Aklief Pregnancy And Lactation Text: It is unknown if this medication is safe to use during pregnancy.  It is unknown if this medication is excreted in breast milk.  Breastfeeding women should use the topical cream on the smallest area of the skin for the shortest time needed while breastfeeding.  Do not apply to nipple and areola.
Ketoconazole Counseling:   Patient counseled regarding improving absorption with orange juice.  Adverse effects include but are not limited to breast enlargement, headache, diarrhea, nausea, upset stomach, liver function test abnormalities, taste disturbance, and stomach pain.  There is a rare possibility of liver failure that can occur when taking ketoconazole. The patient understands that monitoring of LFTs may be required, especially at baseline. The patient verbalized understanding of the proper use and possible adverse effects of ketoconazole.  All of the patient's questions and concerns were addressed.
Opzelura Pregnancy And Lactation Text: There is insufficient data to evaluate drug-associated risk for major birth defects, miscarriage, or other adverse maternal or fetal outcomes.  There is a pregnancy registry that monitors pregnancy outcomes in pregnant persons exposed to the medication during pregnancy.  It is unknown if this medication is excreted in breast milk.  Do not breastfeed during treatment and for about 4 weeks after the last dose.
Tranexamic Acid Counseling:  Patient advised of the small risk of bleeding problems with tranexamic acid. They were also instructed to call if they developed any nausea, vomiting or diarrhea. All of the patient's questions and concerns were addressed.
Topical Sulfur Applications Pregnancy And Lactation Text: This medication is considered safe during pregnancy and breast feeding secondary to limited systemic absorption.
Zyclara Counseling:  I discussed with the patient the risks of imiquimod including but not limited to erythema, scaling, itching, weeping, crusting, and pain.  Patient understands that the inflammatory response to imiquimod is variable from person to person and was educated regarded proper titration schedule.  If flu-like symptoms develop, patient knows to discontinue the medication and contact us.
Metronidazole Counseling:  I discussed with the patient the risks of metronidazole including but not limited to seizures, nausea/vomiting, a metallic taste in the mouth, nausea/vomiting and severe allergy.
Hydroxyzine Pregnancy And Lactation Text: This medication is not safe during pregnancy and should not be taken. It is also excreted in breast milk and breast feeding isn't recommended.
Taltz Pregnancy And Lactation Text: The risk during pregnancy and breastfeeding is uncertain with this medication.
Bactrim Pregnancy And Lactation Text: This medication is Pregnancy Category D and is known to cause fetal risk.  It is also excreted in breast milk.
Adbry Pregnancy And Lactation Text: It is unknown if this medication will adversely affect pregnancy or breast feeding.
Imiquimod Counseling:  I discussed with the patient the risks of imiquimod including but not limited to erythema, scaling, itching, weeping, crusting, and pain.  Patient understands that the inflammatory response to imiquimod is variable from person to person and was educated regarded proper titration schedule.  If flu-like symptoms develop, patient knows to discontinue the medication and contact us.
Colchicine Counseling:  Patient counseled regarding adverse effects including but not limited to stomach upset (nausea, vomiting, stomach pain, or diarrhea).  Patient instructed to limit alcohol consumption while taking this medication.  Colchicine may reduce blood counts especially with prolonged use.  The patient understands that monitoring of kidney function and blood counts may be required, especially at baseline. The patient verbalized understanding of the proper use and possible adverse effects of colchicine.  All of the patient's questions and concerns were addressed.
Siliq Counseling:  I discussed with the patient the risks of Siliq including but not limited to new or worsening depression, suicidal thoughts and behavior, immunosuppression, malignancy, posterior leukoencephalopathy syndrome, and serious infections.  The patient understands that monitoring is required including a PPD at baseline and must alert us or the primary physician if symptoms of infection or other concerning signs are noted. There is also a special program designed to monitor depression which is required with Siliq.
Cyclosporine Counseling:  I discussed with the patient the risks of cyclosporine including but not limited to hypertension, gingival hyperplasia,myelosuppression, immunosuppression, liver damage, kidney damage, neurotoxicity, lymphoma, and serious infections. The patient understands that monitoring is required including baseline blood pressure, CBC, CMP, lipid panel and uric acid, and then 1-2 times monthly CMP and blood pressure.
Olumiant Pregnancy And Lactation Text: Based on animal studies, Olumiant may cause embryo-fetal harm when administered to pregnant women.  The medication should not be used in pregnancy.  Breastfeeding is not recommended during treatment.
Oral Minoxidil Pregnancy And Lactation Text: This medication should only be used when clearly needed if you are pregnant, attempting to become pregnant or breast feeding.
Methotrexate Counseling:  Patient counseled regarding adverse effects of methotrexate including but not limited to nausea, vomiting, abnormalities in liver function tests. Patients may develop mouth sores, rash, diarrhea, and abnormalities in blood counts. The patient understands that monitoring is required including LFT's and blood counts.  There is a rare possibility of scarring of the liver and lung problems that can occur when taking methotrexate. Persistent nausea, loss of appetite, pale stools, dark urine, cough, and shortness of breath should be reported immediately. Patient advised to discontinue methotrexate treatment at least three months before attempting to become pregnant.  I discussed the need for folate supplements while taking methotrexate.  These supplements can decrease side effects during methotrexate treatment. The patient verbalized understanding of the proper use and possible adverse effects of methotrexate.  All of the patient's questions and concerns were addressed.
5-Fu Pregnancy And Lactation Text: This medication is Pregnancy Category X and contraindicated in pregnancy and in women who may become pregnant. It is unknown if this medication is excreted in breast milk.
Odomzo Counseling- I discussed with the patient the risks of Odomzo including but not limited to nausea, vomiting, diarrhea, constipation, weight loss, changes in the sense of taste, decreased appetite, muscle spasms, and hair loss.  The patient verbalized understanding of the proper use and possible adverse effects of Odomzo.  All of the patient's questions and concerns were addressed.
Birth Control Pills Pregnancy And Lactation Text: This medication should be avoided if pregnant and for the first 30 days post-partum.
Topical Clindamycin Counseling: Patient counseled that this medication may cause skin irritation or allergic reactions.  In the event of skin irritation, the patient was advised to reduce the amount of the drug applied or use it less frequently.   The patient verbalized understanding of the proper use and possible adverse effects of clindamycin.  All of the patient's questions and concerns were addressed.
Picato Counseling:  I discussed with the patient the risks of Picato including but not limited to erythema, scaling, itching, weeping, crusting, and pain.
Bexarotene Counseling:  I discussed with the patient the risks of bexarotene including but not limited to hair loss, dry lips/skin/eyes, liver abnormalities, hyperlipidemia, pancreatitis, depression/suicidal ideation, photosensitivity, drug rash/allergic reactions, hypothyroidism, anemia, leukopenia, infection, cataracts, and teratogenicity.  Patient understands that they will need regular blood tests to check lipid profile, liver function tests, white blood cell count, thyroid function tests and pregnancy test if applicable.
Rhofade Pregnancy And Lactation Text: This medication has not been assigned a Pregnancy Risk Category. It is unknown if the medication is excreted in breast milk.
Tetracycline Counseling: Patient counseled regarding possible photosensitivity and increased risk for sunburn.  Patient instructed to avoid sunlight, if possible.  When exposed to sunlight, patients should wear protective clothing, sunglasses, and sunscreen.  The patient was instructed to call the office immediately if the following severe adverse effects occur:  hearing changes, easy bruising/bleeding, severe headache, or vision changes.  The patient verbalized understanding of the proper use and possible adverse effects of tetracycline.  All of the patient's questions and concerns were addressed. Patient understands to avoid pregnancy while on therapy due to potential birth defects.
Enbrel Pregnancy And Lactation Text: This medication is Pregnancy Category B and is considered safe during pregnancy. It is unknown if this medication is excreted in breast milk.
Tranexamic Acid Pregnancy And Lactation Text: It is unknown if this medication is safe during pregnancy or breast feeding.
Low Dose Naltrexone Counseling- I discussed with the patient the potential risks and side effects of low dose naltrexone including but not limited to: more vivid dreams, headaches, nausea, vomiting, abdominal pain, fatigue, dizziness, and anxiety.
Azelaic Acid Counseling: Patient counseled that medicine may cause skin irritation and to avoid applying near the eyes.  In the event of skin irritation, the patient was advised to reduce the amount of the drug applied or use it less frequently.   The patient verbalized understanding of the proper use and possible adverse effects of azelaic acid.  All of the patient's questions and concerns were addressed.
Metronidazole Pregnancy And Lactation Text: This medication is Pregnancy Category B and considered safe during pregnancy.  It is also excreted in breast milk.
Bimzelx Counseling:  I discussed with the patient the risks of Bimzelx including but not limited to depression, immunosuppression, allergic reactions and infections.  The patient understands that monitoring is required including a PPD at baseline and must alert us or the primary physician if symptoms of infection or other concerning signs are noted.
Colchicine Pregnancy And Lactation Text: This medication is Pregnancy Category C and isn't considered safe during pregnancy. It is excreted in breast milk.
Imiquimod Pregnancy And Lactation Text: This medication is Pregnancy Category C. It is unknown if this medication is excreted in breast milk.
Ketoconazole Pregnancy And Lactation Text: This medication is Pregnancy Category C and it isn't know if it is safe during pregnancy. It is also excreted in breast milk and breast feeding isn't recommended.
Tremfya Counseling: I discussed with the patient the risks of guselkumab including but not limited to immunosuppression, serious infections, and drug reactions.  The patient understands that monitoring is required including a PPD at baseline and must alert us or the primary physician if symptoms of infection or other concerning signs are noted.
Odomzo Pregnancy And Lactation Text: This medication is Pregnancy Category X and is absolutely contraindicated during pregnancy. It is unknown if it is excreted in breast milk.
Methotrexate Pregnancy And Lactation Text: This medication is Pregnancy Category X and is known to cause fetal harm. This medication is excreted in breast milk.
Otezla Counseling: The side effects of Otezla were discussed with the patient, including but not limited to worsening or new depression, weight loss, diarrhea, nausea, upper respiratory tract infection, and headache. Patient instructed to call the office should any adverse effect occur.  The patient verbalized understanding of the proper use and possible adverse effects of Otezla.  All the patient's questions and concerns were addressed.
Cephalexin Counseling: I counseled the patient regarding use of cephalexin as an antibiotic for prophylactic and/or therapeutic purposes. Cephalexin (commonly prescribed under brand name Keflex) is a cephalosporin antibiotic which is active against numerous classes of bacteria, including most skin bacteria. Side effects may include nausea, diarrhea, gastrointestinal upset, rash, hives, yeast infections, and in rare cases, hepatitis, kidney disease, seizures, fever, confusion, neurologic symptoms, and others. Patients with severe allergies to penicillin medications are cautioned that there is about a 10% incidence of cross-reactivity with cephalosporins. When possible, patients with penicillin allergies should use alternatives to cephalosporins for antibiotic therapy.
Spironolactone Counseling: Patient advised regarding risks of diarrhea, abdominal pain, hyperkalemia, birth defects (for female patients), liver toxicity and renal toxicity. The patient may need blood work to monitor liver and kidney function and potassium levels while on therapy. The patient verbalized understanding of the proper use and possible adverse effects of spironolactone.  All of the patient's questions and concerns were addressed.
Wartpeel Counseling:  I discussed with the patient the risks of Wartpeel including but not limited to erythema, scaling, itching, weeping, crusting, and pain.
Rinvoq Counseling: I discussed with the patient the risks of Rinvoq therapy including but not limited to upper respiratory tract infections, shingles, cold sores, bronchitis, nausea, cough, fever, acne, and headache. Live vaccines should be avoided.  This medication has been linked to serious infections; higher rate of mortality; malignancy and lymphoproliferative disorders; major adverse cardiovascular events; thrombosis; thrombocytopenia, anemia, and neutropenia; lipid elevations; liver enzyme elevations; and gastrointestinal perforations.
Drysol Counseling:  I discussed with the patient the risks of drysol/aluminum chloride including but not limited to skin rash, itching, irritation, burning.
Cyclosporine Pregnancy And Lactation Text: This medication is Pregnancy Category C and it isn't know if it is safe during pregnancy. This medication is excreted in breast milk.
Azelaic Acid Pregnancy And Lactation Text: This medication is considered safe during pregnancy and breast feeding.
Bexarotene Pregnancy And Lactation Text: This medication is Pregnancy Category X and should not be given to women who are pregnant or may become pregnant. This medication should not be used if you are breast feeding.
Topical Clindamycin Pregnancy And Lactation Text: This medication is Pregnancy Category B and is considered safe during pregnancy. It is unknown if it is excreted in breast milk.
Dapsone Counseling: I discussed with the patient the risks of dapsone including but not limited to hemolytic anemia, agranulocytosis, rashes, methemoglobinemia, kidney failure, peripheral neuropathy, headaches, GI upset, and liver toxicity.  Patients who start dapsone require monitoring including baseline LFTs and weekly CBCs for the first month, then every month thereafter.  The patient verbalized understanding of the proper use and possible adverse effects of dapsone.  All of the patient's questions and concerns were addressed.
Valtrex Counseling: I discussed with the patient the risks of valacyclovir including but not limited to kidney damage, nausea, vomiting and severe allergy.  The patient understands that if the infection seems to be worsening or is not improving, they are to call.
Bimzelx Pregnancy And Lactation Text: This medication crosses the placenta and the safety is uncertain during pregnancy. It is unknown if this medication is present in breast milk.
Solaraze Counseling:  I discussed with the patient the risks of Solaraze including but not limited to erythema, scaling, itching, weeping, crusting, and pain.
Minocycline Counseling: Patient advised regarding possible photosensitivity and discoloration of the teeth, skin, lips, tongue and gums.  Patient instructed to avoid sunlight, if possible.  When exposed to sunlight, patients should wear protective clothing, sunglasses, and sunscreen.  The patient was instructed to call the office immediately if the following severe adverse effects occur:  hearing changes, easy bruising/bleeding, severe headache, or vision changes.  The patient verbalized understanding of the proper use and possible adverse effects of minocycline.  All of the patient's questions and concerns were addressed.
Humira Counseling:  I discussed with the patient the risks of adalimumab including but not limited to myelosuppression, immunosuppression, autoimmune hepatitis, demyelinating diseases, lymphoma, and serious infections.  The patient understands that monitoring is required including a PPD at baseline and must alert us or the primary physician if symptoms of infection or other concerning signs are noted.
Terbinafine Counseling: Patient counseling regarding adverse effects of terbinafine including but not limited to headache, diarrhea, rash, upset stomach, liver function test abnormalities, itching, taste/smell disturbance, nausea, abdominal pain, and flatulence.  There is a rare possibility of liver failure that can occur when taking terbinafine.  The patient understands that a baseline LFT and kidney function test may be required. The patient verbalized understanding of the proper use and possible adverse effects of terbinafine.  All of the patient's questions and concerns were addressed.
Low Dose Naltrexone Pregnancy And Lactation Text: Naltrexone is pregnancy category C.  There have been no adequate and well-controlled studies in pregnant women.  It should be used in pregnancy only if the potential benefit justifies the potential risk to the fetus.   Limited data indicates that naltrexone is minimally excreted into breastmilk.
Rinvoq Pregnancy And Lactation Text: Based on animal studies, Rinvoq may cause embryo-fetal harm when administered to pregnant women.  The medication should not be used in pregnancy.  Breastfeeding is not recommended during treatment and for 6 days after the last dose.
Klisyri Counseling:  I discussed with the patient the risks of Klisyri including but not limited to erythema, scaling, itching, weeping, crusting, and pain.
Prednisone Counseling:  I discussed with the patient the risks of prolonged use of prednisone including but not limited to weight gain, insomnia, osteoporosis, mood changes, diabetes, susceptibility to infection, glaucoma and high blood pressure.  In cases where prednisone use is prolonged, patients should be monitored with blood pressure checks, serum glucose levels and an eye exam.  Additionally, the patient may need to be placed on GI prophylaxis, PCP prophylaxis, and calcium and vitamin D supplementation and/or a bisphosphonate.  The patient verbalized understanding of the proper use and the possible adverse effects of prednisone.  All of the patient's questions and concerns were addressed.
Topical Ketoconazole Counseling: Patient counseled that this medication may cause skin irritation or allergic reactions.  In the event of skin irritation, the patient was advised to reduce the amount of the drug applied or use it less frequently.   The patient verbalized understanding of the proper use and possible adverse effects of ketoconazole.  All of the patient's questions and concerns were addressed.
Cephalexin Pregnancy And Lactation Text: This medication is Pregnancy Category B and considered safe during pregnancy.  It is also excreted in breast milk but can be used safely for shorter doses.
Simponi Counseling:  I discussed with the patient the risks of golimumab including but not limited to myelosuppression, immunosuppression, autoimmune hepatitis, demyelinating diseases, lymphoma, and serious infections.  The patient understands that monitoring is required including a PPD at baseline and must alert us or the primary physician if symptoms of infection or other concerning signs are noted.
Otezla Pregnancy And Lactation Text: This medication is Pregnancy Category C and it isn't known if it is safe during pregnancy. It is unknown if it is excreted in breast milk.
Spironolactone Pregnancy And Lactation Text: This medication can cause feminization of the male fetus and should be avoided during pregnancy. The active metabolite is also found in breast milk.
Detail Level: Simple
Valtrex Pregnancy And Lactation Text: this medication is Pregnancy Category B and is considered safe during pregnancy. This medication is not directly found in breast milk but it's metabolite acyclovir is present.
Niacinamide Counseling: I recommended taking niacin or niacinamide, also know as vitamin B3, twice daily. Recent evidence suggests that taking vitamin B3 (500 mg twice daily) can reduce the risk of actinic keratoses and non-melanoma skin cancers. Side effects of vitamin B3 include flushing and headache.
Benzoyl Peroxide Counseling: Patient counseled that medicine may cause skin irritation and bleach clothing.  In the event of skin irritation, the patient was advised to reduce the amount of the drug applied or use it less frequently.   The patient verbalized understanding of the proper use and possible adverse effects of benzoyl peroxide.  All of the patient's questions and concerns were addressed.
Terbinafine Pregnancy And Lactation Text: This medication is Pregnancy Category B and is considered safe during pregnancy. It is also excreted in breast milk and breast feeding isn't recommended.
Isotretinoin Counseling: Patient should get monthly blood tests, not donate blood, not drive at night if vision affected, not share medication, and not undergo elective surgery for 6 months after tx completed. Side effects reviewed, pt to contact office should one occur.
Opioid Counseling: I discussed with the patient the potential side effects of opioids including but not limited to addiction, altered mental status, and depression. I stressed avoiding alcohol, benzodiazepines, muscle relaxants and sleep aids unless specifically okayed by a physician. The patient verbalized understanding of the proper use and possible adverse effects of opioids. All of the patient's questions and concerns were addressed. They were instructed to flush the remaining pills down the toilet if they did not need them for pain.
Protopic Counseling: Patient may experience a mild burning sensation during topical application. Protopic is not approved in children less than 2 years of age. There have been case reports of hematologic and skin malignancies in patients using topical calcineurin inhibitors although causality is questionable.
Dutasteride Male Counseling: Dustasteride Counseling:  I discussed with the patient the risks of use of dutasteride including but not limited to decreased libido, decreased ejaculate volume, and gynecomastia. Women who can become pregnant should not handle medication.  All of the patient's questions and concerns were addressed.
Solaraze Pregnancy And Lactation Text: This medication is Pregnancy Category B and is considered safe. There is some data to suggest avoiding during the third trimester. It is unknown if this medication is excreted in breast milk.
Klisyri Pregnancy And Lactation Text: It is unknown if this medication can harm a developing fetus or if it is excreted in breast milk.
Xolair Counseling:  Patient informed of potential adverse effects including but not limited to fever, muscle aches, rash and allergic reactions.  The patient verbalized understanding of the proper use and possible adverse effects of Xolair.  All of the patient's questions and concerns were addressed.
Dapsone Pregnancy And Lactation Text: This medication is Pregnancy Category C and is not considered safe during pregnancy or breast feeding.
Cimzia Counseling:  I discussed with the patient the risks of Cimzia including but not limited to immunosuppression, allergic reactions and infections.  The patient understands that monitoring is required including a PPD at baseline and must alert us or the primary physician if symptoms of infection or other concerning signs are noted.
Fluconazole Counseling:  Patient counseled regarding adverse effects of fluconazole including but not limited to headache, diarrhea, nausea, upset stomach, liver function test abnormalities, taste disturbance, and stomach pain.  There is a rare possibility of liver failure that can occur when taking fluconazole.  The patient understands that monitoring of LFTs and kidney function test may be required, especially at baseline. The patient verbalized understanding of the proper use and possible adverse effects of fluconazole.  All of the patient's questions and concerns were addressed.
Elidel Counseling: Patient may experience a mild burning sensation during topical application. Elidel is not approved in children less than 2 years of age. There have been case reports of hematologic and skin malignancies in patients using topical calcineurin inhibitors although causality is questionable.
Oxybutynin Counseling:  I discussed with the patient the risks of oxybutynin including but not limited to skin rash, drowsiness, dry mouth, difficulty urinating, and blurred vision.
Clindamycin Counseling: I counseled the patient regarding use of clindamycin as an antibiotic for prophylactic and/or therapeutic purposes. Clindamycin is active against numerous classes of bacteria, including skin bacteria. Side effects may include nausea, diarrhea, gastrointestinal upset, rash, hives, yeast infections, and in rare cases, colitis.
Winlevi Counseling:  I discussed with the patient the risks of topical clascoterone including but not limited to erythema, scaling, itching, and stinging. Patient voiced their understanding.
Isotretinoin Pregnancy And Lactation Text: This medication is Pregnancy Category X and is considered extremely dangerous during pregnancy. It is unknown if it is excreted in breast milk.
Include Pregnancy/Lactation Warning?: No
Niacinamide Pregnancy And Lactation Text: These medications are considered safe during pregnancy.
Dutasteride Female Counseling: Dutasteride Counseling:  I discussed with the patient the risks of use of dutasteride including but not limited to decreased libido and sexual dysfunction. Explained the teratogenic nature of the medication and stressed the importance of not getting pregnant during treatment. All of the patient's questions and concerns were addressed.
Ilumya Counseling: I discussed with the patient the risks of tildrakizumab including but not limited to immunosuppression, malignancy, posterior leukoencephalopathy syndrome, and serious infections.  The patient understands that monitoring is required including a PPD at baseline and must alert us or the primary physician if symptoms of infection or other concerning signs are noted.
Sotyktu Counseling:  I discussed the most common side effects of Sotyktu including: common cold, sore throat, sinus infections, cold sores, canker sores, folliculitis, and acne.? I also discussed more serious side effects of Sotyktu including but not limited to: serious allergic reactions; increased risk for infections such as TB; cancers such as lymphomas; rhabdomyolysis and elevated CPK; and elevated triglycerides and liver enzymes.?
Opioid Pregnancy And Lactation Text: These medications can lead to premature delivery and should be avoided during pregnancy. These medications are also present in breast milk in small amounts.
Benzoyl Peroxide Pregnancy And Lactation Text: This medication is Pregnancy Category C. It is unknown if benzoyl peroxide is excreted in breast milk.
Quinolones Counseling:  I discussed with the patient the risks of fluoroquinolones including but not limited to GI upset, allergic reaction, drug rash, diarrhea, dizziness, photosensitivity, yeast infections, liver function test abnormalities, tendonitis/tendon rupture.
Gabapentin Counseling: I discussed with the patient the risks of gabapentin including but not limited to dizziness, somnolence, fatigue and ataxia.
Minoxidil Counseling: Minoxidil is a topical medication which can increase blood flow where it is applied. It is uncertain how this medication increases hair growth. Side effects are uncommon and include stinging and allergic reactions.
Azathioprine Counseling:  I discussed with the patient the risks of azathioprine including but not limited to myelosuppression, immunosuppression, hepatotoxicity, lymphoma, and infections.  The patient understands that monitoring is required including baseline LFTs, Creatinine, possible TPMP genotyping and weekly CBCs for the first month and then every 2 weeks thereafter.  The patient verbalized understanding of the proper use and possible adverse effects of azathioprine.  All of the patient's questions and concerns were addressed.
Protopic Pregnancy And Lactation Text: This medication is Pregnancy Category C. It is unknown if this medication is excreted in breast milk when applied topically.
Albendazole Counseling:  I discussed with the patient the risks of albendazole including but not limited to cytopenia, kidney damage, nausea/vomiting and severe allergy.  The patient understands that this medication is being used in an off-label manner.
Soolantra Counseling: I discussed with the patients the risks of topial Soolantra. This is a medicine which decreases the number of mites and inflammation in the skin. You experience burning, stinging, eye irritation or allergic reactions.  Please call our office if you develop any problems from using this medication.
Fluconazole Pregnancy And Lactation Text: This medication is Pregnancy Category C and it isn't know if it is safe during pregnancy. It is also excreted in breast milk.
Cimetidine Counseling:  I discussed with the patient the risks of Cimetidine including but not limited to gynecomastia, headache, diarrhea, nausea, drowsiness, arrhythmias, pancreatitis, skin rashes, psychosis, bone marrow suppression and kidney toxicity.
Winlevi Pregnancy And Lactation Text: This medication is considered safe during pregnancy and breastfeeding.
Xolair Pregnancy And Lactation Text: This medication is Pregnancy Category B and is considered safe during pregnancy. This medication is excreted in breast milk.
Clindamycin Pregnancy And Lactation Text: This medication can be used in pregnancy if certain situations. Clindamycin is also present in breast milk.
Cimzia Pregnancy And Lactation Text: This medication crosses the placenta but can be considered safe in certain situations. Cimzia may be excreted in breast milk.
Sotyktu Pregnancy And Lactation Text: There is insufficient data to evaluate whether or not Sotyktu is safe to use during pregnancy.? ?It is not known if Sotyktu passes into breast milk and whether or not it is safe to use when breastfeeding.??
Skyrizi Counseling: I discussed with the patient the risks of risankizumab-rzaa including but not limited to immunosuppression, and serious infections.  The patient understands that monitoring is required including a PPD at baseline and must alert us or the primary physician if symptoms of infection or other concerning signs are noted.
High Dose Vitamin A Counseling: Side effects reviewed, pt to contact office should one occur.
Topical Metronidazole Counseling: Metronidazole is a topical antibiotic medication. You may experience burning, stinging, redness, or allergic reactions.  Please call our office if you develop any problems from using this medication.
Qbrexza Counseling:  I discussed with the patient the risks of Qbrexza including but not limited to headache, mydriasis, blurred vision, dry eyes, nasal dryness, dry mouth, dry throat, dry skin, urinary hesitation, and constipation.  Local skin reactions including erythema, burning, stinging, and itching can also occur.
Albendazole Pregnancy And Lactation Text: This medication is Pregnancy Category C and it isn't known if it is safe during pregnancy. It is also excreted in breast milk.
Cibinqo Counseling: I discussed with the patient the risks of Cibinqo therapy including but not limited to common cold, nausea, headache, cold sores, increased blood CPK levels, dizziness, UTIs, fatigue, acne, and vomitting. Live vaccines should be avoided.  This medication has been linked to serious infections; higher rate of mortality; malignancy and lymphoproliferative disorders; major adverse cardiovascular events; thrombosis; thrombocytopenia and lymphopenia; lipid elevations; and retinal detachment.
Soolantra Pregnancy And Lactation Text: This medication is Pregnancy Category C. This medication is considered safe during breast feeding.
Nsaids Counseling: NSAID Counseling: I discussed with the patient that NSAIDs should be taken with food. Prolonged use of NSAIDs can result in the development of stomach ulcers.  Patient advised to stop taking NSAIDs if abdominal pain occurs.  The patient verbalized understanding of the proper use and possible adverse effects of NSAIDs.  All of the patient's questions and concerns were addressed.
Dutasteride Pregnancy And Lactation Text: This medication is absolutely contraindicated in women, especially during pregnancy and breast feeding. Feminization of male fetuses is possible if taking while pregnant.
Carac Counseling:  I discussed with the patient the risks of Carac including but not limited to erythema, scaling, itching, weeping, crusting, and pain.
Cosentyx Counseling:  I discussed with the patient the risks of Cosentyx including but not limited to worsening of Crohn's disease, immunosuppression, allergic reactions and infections.  The patient understands that monitoring is required including a PPD at baseline and must alert us or the primary physician if symptoms of infection or other concerning signs are noted.
Azathioprine Pregnancy And Lactation Text: This medication is Pregnancy Category D and isn't considered safe during pregnancy. It is unknown if this medication is excreted in breast milk.
Minoxidil Pregnancy And Lactation Text: This medication has not been assigned a Pregnancy Risk Category but animal studies failed to show danger with the topical medication. It is unknown if the medication is excreted in breast milk.
Griseofulvin Counseling:  I discussed with the patient the risks of griseofulvin including but not limited to photosensitivity, cytopenia, liver damage, nausea/vomiting and severe allergy.  The patient understands that this medication is best absorbed when taken with a fatty meal (e.g., ice cream or french fries).
Topical Metronidazole Pregnancy And Lactation Text: This medication is Pregnancy Category B and considered safe during pregnancy.  It is also considered safe to use while breastfeeding.
Doxycycline Counseling:  Patient counseled regarding possible photosensitivity and increased risk for sunburn.  Patient instructed to avoid sunlight, if possible.  When exposed to sunlight, patients should wear protective clothing, sunglasses, and sunscreen.  The patient was instructed to call the office immediately if the following severe adverse effects occur:  hearing changes, easy bruising/bleeding, severe headache, or vision changes.  The patient verbalized understanding of the proper use and possible adverse effects of doxycycline.  All of the patient's questions and concerns were addressed.
Propranolol Counseling:  I discussed with the patient the risks of propranolol including but not limited to low heart rate, low blood pressure, low blood sugar, restlessness and increased cold sensitivity. They should call the office if they experience any of these side effects.
VTAMA Counseling: I discussed with the patient that VTAMA is not for use in the eyes, mouth or mouth. They should call the office if they develop any signs of allergic reactions to VTAMA. The patient verbalized understanding of the proper use and possible adverse effects of VTAMA.  All of the patient's questions and concerns were addressed.
High Dose Vitamin A Pregnancy And Lactation Text: High dose vitamin A therapy is contraindicated during pregnancy and breast feeding.
Arava Counseling:  Patient counseled regarding adverse effects of Arava including but not limited to nausea, vomiting, abnormalities in liver function tests. Patients may develop mouth sores, rash, diarrhea, and abnormalities in blood counts. The patient understands that monitoring is required including LFTs and blood counts.  There is a rare possibility of scarring of the liver and lung problems that can occur when taking methotrexate. Persistent nausea, loss of appetite, pale stools, dark urine, cough, and shortness of breath should be reported immediately. Patient advised to discontinue Arava treatment and consult with a physician prior to attempting conception. The patient will have to undergo a treatment to eliminate Arava from the body prior to conception.
Xeljanz Counseling: I discussed with the patient the risks of Xeljanz therapy including increased risk of infection, liver issues, headache, diarrhea, or cold symptoms. Live vaccines should be avoided. They were instructed to call if they have any problems.
Eucrisa Counseling: Patient may experience a mild burning sensation during topical application. Eucrisa is not approved in children less than 3 months of age.
Infliximab Counseling:  I discussed with the patient the risks of infliximab including but not limited to myelosuppression, immunosuppression, autoimmune hepatitis, demyelinating diseases, lymphoma, and serious infections.  The patient understands that monitoring is required including a PPD at baseline and must alert us or the primary physician if symptoms of infection or other concerning signs are noted.
Nsaids Pregnancy And Lactation Text: These medications are considered safe up to 30 weeks gestation. It is excreted in breast milk.
Qbrexza Pregnancy And Lactation Text: There is no available data on Qbrexza use in pregnant women.  There is no available data on Qbrexza use in lactation.
Ivermectin Counseling:  Patient instructed to take medication on an empty stomach with a full glass of water.  Patient informed of potential adverse effects including but not limited to nausea, diarrhea, dizziness, itching, and swelling of the extremities or lymph nodes.  The patient verbalized understanding of the proper use and possible adverse effects of ivermectin.  All of the patient's questions and concerns were addressed.
Erivedge Counseling- I discussed with the patient the risks of Erivedge including but not limited to nausea, vomiting, diarrhea, constipation, weight loss, changes in the sense of taste, decreased appetite, muscle spasms, and hair loss.  The patient verbalized understanding of the proper use and possible adverse effects of Erivedge.  All of the patient's questions and concerns were addressed.
Cibinqo Pregnancy And Lactation Text: It is unknown if this medication will adversely affect pregnancy or breast feeding.  You should not take this medication if you are currently pregnant or planning a pregnancy or while breastfeeding.
Glycopyrrolate Counseling:  I discussed with the patient the risks of glycopyrrolate including but not limited to skin rash, drowsiness, dry mouth, difficulty urinating, and blurred vision.
Rifampin Counseling: I discussed with the patient the risks of rifampin including but not limited to liver damage, kidney damage, red-orange body fluids, nausea/vomiting and severe allergy.
Finasteride Male Counseling: Finasteride Counseling:  I discussed with the patient the risks of use of finasteride including but not limited to decreased libido, decreased ejaculate volume, gynecomastia, and depression. Women should not handle medication.  All of the patient's questions and concerns were addressed.
Topical Retinoid counseling:  Patient advised to apply a pea-sized amount only at bedtime and wait 30 minutes after washing their face before applying.  If too drying, patient may add a non-comedogenic moisturizer. The patient verbalized understanding of the proper use and possible adverse effects of retinoids.  All of the patient's questions and concerns were addressed.
Cellcept Counseling:  I discussed with the patient the risks of mycophenolate mofetil including but not limited to infection/immunosuppression, GI upset, hypokalemia, hypercholesterolemia, bone marrow suppression, lymphoproliferative disorders, malignancy, GI ulceration/bleed/perforation, colitis, interstitial lung disease, kidney failure, progressive multifocal leukoencephalopathy, and birth defects.  The patient understands that monitoring is required including a baseline creatinine and regular CBC testing. In addition, patient must alert us immediately if symptoms of infection or other concerning signs are noted.
Doxycycline Pregnancy And Lactation Text: This medication is Pregnancy Category D and not consider safe during pregnancy. It is also excreted in breast milk but is considered safe for shorter treatment courses.
Griseofulvin Pregnancy And Lactation Text: This medication is Pregnancy Category X and is known to cause serious birth defects. It is unknown if this medication is excreted in breast milk but breast feeding should be avoided.
Mirvaso Counseling: Mirvaso is a topical medication which can decrease superficial blood flow where applied. Side effects are uncommon and include stinging, redness and allergic reactions.
Doxepin Counseling:  Patient advised that the medication is sedating and not to drive a car after taking this medication. Patient informed of potential adverse effects including but not limited to dry mouth, urinary retention, and blurry vision.  The patient verbalized understanding of the proper use and possible adverse effects of doxepin.  All of the patient's questions and concerns were addressed.
Topical Steroids Counseling: I discussed with the patient that prolonged use of topical steroids can result in the increased appearance of superficial blood vessels (telangiectasias), lightening (hypopigmentation) and thinning of the skin (atrophy).  Patient understands to avoid using high potency steroids in skin folds, the groin or the face.  The patient verbalized understanding of the proper use and possible adverse effects of topical steroids.  All of the patient's questions and concerns were addressed.
Xelyolandaz Pregnancy And Lactation Text: This medication is Pregnancy Category D and is not considered safe during pregnancy.  The risk during breast feeding is also uncertain.
Olanzapine Counseling- I discussed with the patient the common side effects of olanzapine including but are not limited to: lack of energy, dry mouth, increased appetite, sleepiness, tremor, constipation, dizziness, changes in behavior, or restlessness.  Explained that teenagers are more likely to experience headaches, abdominal pain, pain in the arms or legs, tiredness, and sleepiness.  Serious side effects include but are not limited: increased risk of death in elderly patients who are confused, have memory loss, or dementia-related psychosis; hyperglycemia; increased cholesterol and triglycerides; and weight gain.
Propranolol Pregnancy And Lactation Text: This medication is Pregnancy Category C and it isn't known if it is safe during pregnancy. It is excreted in breast milk.
Stelara Counseling:  I discussed with the patient the risks of ustekinumab including but not limited to immunosuppression, malignancy, posterior leukoencephalopathy syndrome, and serious infections.  The patient understands that monitoring is required including a PPD at baseline and must alert us or the primary physician if symptoms of infection or other concerning signs are noted.
Azithromycin Counseling:  I discussed with the patient the risks of azithromycin including but not limited to GI upset, allergic reaction, drug rash, diarrhea, and yeast infections.
Vtama Pregnancy And Lactation Text: It is unknown if this medication can cause problems during pregnancy and breastfeeding.
Litfulo Counseling: I discussed with the patient the risks of Litfulo therapy including but not limited to upper respiratory tract infections, shingles, cold sores, and nausea. Live vaccines should be avoided.  This medication has been linked to serious infections; higher rate of mortality; malignancy and lymphoproliferative disorders; major adverse cardiovascular events; thrombosis; gastrointestinal perforations; neutropenia; lymphopenia; anemia; liver enzyme elevations; and lipid elevations.
Finasteride Female Counseling: Finasteride Counseling:  I discussed with the patient the risks of use of finasteride including but not limited to decreased libido and sexual dysfunction. Explained the teratogenic nature of the medication and stressed the importance of not getting pregnant during treatment. All of the patient's questions and concerns were addressed.
Calcipotriene Counseling:  I discussed with the patient the risks of calcipotriene including but not limited to erythema, scaling, itching, and irritation.
Rhofade Counseling: Rhofade is a topical medication which can decrease superficial blood flow where applied. Side effects are uncommon and include stinging, redness and allergic reactions.
Rifampin Pregnancy And Lactation Text: This medication is Pregnancy Category C and it isn't know if it is safe during pregnancy. It is also excreted in breast milk and should not be used if you are breast feeding.
Clofazimine Counseling:  I discussed with the patient the risks of clofazimine including but not limited to skin and eye pigmentation, liver damage, nausea/vomiting, gastrointestinal bleeding and allergy.
Calcipotriene Pregnancy And Lactation Text: The use of this medication during pregnancy or lactation is not recommended as there is insufficient data.
Glycopyrrolate Pregnancy And Lactation Text: This medication is Pregnancy Category B and is considered safe during pregnancy. It is unknown if it is excreted breast milk.
Erythromycin Counseling:  I discussed with the patient the risks of erythromycin including but not limited to GI upset, allergic reaction, drug rash, diarrhea, increase in liver enzymes, and yeast infections.
Thalidomide Counseling: I discussed with the patient the risks of thalidomide including but not limited to birth defects, anxiety, weakness, chest pain, dizziness, cough and severe allergy.
Dupixent Counseling: I discussed with the patient the risks of dupilumab including but not limited to eye infection and irritation, cold sores, injection site reactions, worsening of asthma, allergic reactions and increased risk of parasitic infection.  Live vaccines should be avoided while taking dupilumab. Dupilumab will also interact with certain medications such as warfarin and cyclosporine. The patient understands that monitoring is required and they must alert us or the primary physician if symptoms of infection or other concerning signs are noted.
Itraconazole Counseling:  I discussed with the patient the risks of itraconazole including but not limited to liver damage, nausea/vomiting, neuropathy, and severe allergy.  The patient understands that this medication is best absorbed when taken with acidic beverages such as non-diet cola or ginger ale.  The patient understands that monitoring is required including baseline LFTs and repeat LFTs at intervals.  The patient understands that they are to contact us or the primary physician if concerning signs are noted.
SSKI Counseling:  I discussed with the patient the risks of SSKI including but not limited to thyroid abnormalities, metallic taste, GI upset, fever, headache, acne, arthralgias, paraesthesias, lymphadenopathy, easy bleeding, arrhythmias, and allergic reaction.
Libtayo Counseling- I discussed with the patient the risks of Libtayo including but not limited to nausea, vomiting, diarrhea, and bone or muscle pain.  The patient verbalized understanding of the proper use and possible adverse effects of Libtayo.  All of the patient's questions and concerns were addressed.
Hydroquinone Counseling:  Patient advised that medication may result in skin irritation, lightening (hypopigmentation), dryness, and burning.  In the event of skin irritation, the patient was advised to reduce the amount of the drug applied or use it less frequently.  Rarely, spots that are treated with hydroquinone can become darker (pseudoochronosis).  Should this occur, patient instructed to stop medication and call the office. The patient verbalized understanding of the proper use and possible adverse effects of hydroquinone.  All of the patient's questions and concerns were addressed.
Zoryve Counseling:  I discussed with the patient that Zoryve is not for use in the eyes, mouth or vagina. The most commonly reported side effects include diarrhea, headache, insomnia, application site pain, upper respiratory tract infections, and urinary tract infections.  All of the patient's questions and concerns were addressed.
Doxepin Pregnancy And Lactation Text: This medication is Pregnancy Category C and it isn't known if it is safe during pregnancy. It is also excreted in breast milk and breast feeding isn't recommended.
Tazorac Counseling:  Patient advised that medication is irritating and drying.  Patient may need to apply sparingly and wash off after an hour before eventually leaving it on overnight.  The patient verbalized understanding of the proper use and possible adverse effects of tazorac.  All of the patient's questions and concerns were addressed.
Azithromycin Pregnancy And Lactation Text: This medication is considered safe during pregnancy and is also secreted in breast milk.
Olanzapine Pregnancy And Lactation Text: This medication is pregnancy category C.   There are no adequate and well controlled trials with olanzapine in pregnant females.  Olanzapine should be used during pregnancy only if the potential benefit justifies the potential risk to the fetus.   In a study in lactating healthy women, olanzapine was excreted in breast milk.  It is recommended that women taking olanzapine should not breast feed.
Finasteride Pregnancy And Lactation Text: This medication is absolutely contraindicated during pregnancy. It is unknown if it is excreted in breast milk.
Rituxan Counseling:  I discussed with the patient the risks of Rituxan infusions. Side effects can include infusion reactions, severe drug rashes including mucocutaneous reactions, reactivation of latent hepatitis and other infections and rarely progressive multifocal leukoencephalopathy.  All of the patient's questions and concerns were addressed.
Topical Steroids Applications Pregnancy And Lactation Text: Most topical steroids are considered safe to use during pregnancy and lactation.  Any topical steroid applied to the breast or nipple should be washed off before breastfeeding.
Acitretin Counseling:  I discussed with the patient the risks of acitretin including but not limited to hair loss, dry lips/skin/eyes, liver damage, hyperlipidemia, depression/suicidal ideation, photosensitivity.  Serious rare side effects can include but are not limited to pancreatitis, pseudotumor cerebri, bony changes, clot formation/stroke/heart attack.  Patient understands that alcohol is contraindicated since it can result in liver toxicity and significantly prolong the elimination of the drug by many years.
Litfulo Pregnancy And Lactation Text: Based on animal studies, Lifulo may cause embryo-fetal harm when administered to pregnant women.  The medication should not be used in pregnancy.  Breastfeeding is not recommended during treatment.
Cantharidin Counseling:  I discussed with the patient the risks of Cantharidin including but not limited to pain, redness, burning, itching, and blistering.
Dupixent Pregnancy And Lactation Text: This medication likely crosses the placenta but the risk for the fetus is uncertain. This medication is excreted in breast milk.
Sarecycline Counseling: Patient advised regarding possible photosensitivity and discoloration of the teeth, skin, lips, tongue and gums.  Patient instructed to avoid sunlight, if possible.  When exposed to sunlight, patients should wear protective clothing, sunglasses, and sunscreen.  The patient was instructed to call the office immediately if the following severe adverse effects occur:  hearing changes, easy bruising/bleeding, severe headache, or vision changes.  The patient verbalized understanding of the proper use and possible adverse effects of sarecycline.  All of the patient's questions and concerns were addressed.
Hydroxychloroquine Counseling:  I discussed with the patient that a baseline ophthalmologic exam is needed at the start of therapy and every year thereafter while on therapy. A CBC may also be warranted for monitoring.  The side effects of this medication were discussed with the patient, including but not limited to agranulocytosis, aplastic anemia, seizures, rashes, retinopathy, and liver toxicity. Patient instructed to call the office should any adverse effect occur.  The patient verbalized understanding of the proper use and possible adverse effects of Plaquenil.  All the patient's questions and concerns were addressed.
Opzelura Counseling:  I discussed with the patient the risks of Opzelura including but not limited to nasopharngitis, bronchitis, ear infection, eosinophila, hives, diarrhea, folliculitis, tonsillitis, and rhinorrhea.  Taken orally, this medication has been linked to serious infections; higher rate of mortality; malignancy and lymphoproliferative disorders; major adverse cardiovascular events; thrombosis; thrombocytopenia, anemia, and neutropenia; and lipid elevations.
Cyclophosphamide Counseling:  I discussed with the patient the risks of cyclophosphamide including but not limited to hair loss, hormonal abnormalities, decreased fertility, abdominal pain, diarrhea, nausea and vomiting, bone marrow suppression and infection. The patient understands that monitoring is required while taking this medication.
Hydroxyzine Counseling: Patient advised that the medication is sedating and not to drive a car after taking this medication.  Patient informed of potential adverse effects including but not limited to dry mouth, urinary retention, and blurry vision.  The patient verbalized understanding of the proper use and possible adverse effects of hydroxyzine.  All of the patient's questions and concerns were addressed.
Cantharidin Pregnancy And Lactation Text: This medication has not been proven safe during pregnancy. It is unknown if this medication is excreted in breast milk.
Erythromycin Pregnancy And Lactation Text: This medication is Pregnancy Category B and is considered safe during pregnancy. It is also excreted in breast milk.
Aklief counseling:  Patient advised to apply a pea-sized amount only at bedtime and wait 30 minutes after washing their face before applying.  If too drying, patient may add a non-comedogenic moisturizer.  The most commonly reported side effects including irritation, redness, scaling, dryness, stinging, burning, itching, and increased risk of sunburn.  The patient verbalized understanding of the proper use and possible adverse effects of retinoids.  All of the patient's questions and concerns were addressed.
Sski Pregnancy And Lactation Text: This medication is Pregnancy Category D and isn't considered safe during pregnancy. It is excreted in breast milk.
Adbry Counseling: I discussed with the patient the risks of tralokinumab including but not limited to eye infection and irritation, cold sores, injection site reactions, worsening of asthma, allergic reactions and increased risk of parasitic infection.  Live vaccines should be avoided while taking tralokinumab. The patient understands that monitoring is required and they must alert us or the primary physician if symptoms of infection or other concerning signs are noted.
Taltz Counseling: I discussed with the patient the risks of ixekizumab including but not limited to immunosuppression, serious infections, worsening of inflammatory bowel disease and drug reactions.  The patient understands that monitoring is required including a PPD at baseline and must alert us or the primary physician if symptoms of infection or other concerning signs are noted.
Bactrim Counseling:  I discussed with the patient the risks of sulfa antibiotics including but not limited to GI upset, allergic reaction, drug rash, diarrhea, dizziness, photosensitivity, and yeast infections.  Rarely, more serious reactions can occur including but not limited to aplastic anemia, agranulocytosis, methemoglobinemia, blood dyscrasias, liver or kidney failure, lung infiltrates or desquamative/blistering drug rashes.
Libtayo Pregnancy And Lactation Text: This medication is contraindicated in pregnancy and when breast feeding.
5-Fu Counseling: 5-Fluorouracil Counseling:  I discussed with the patient the risks of 5-fluorouracil including but not limited to erythema, scaling, itching, weeping, crusting, and pain.
Topical Sulfur Applications Counseling: Topical Sulfur Counseling: Patient counseled that this medication may cause skin irritation or allergic reactions.  In the event of skin irritation, the patient was advised to reduce the amount of the drug applied or use it less frequently.   The patient verbalized understanding of the proper use and possible adverse effects of topical sulfur application.  All of the patient's questions and concerns were addressed.

## 2024-06-03 ENCOUNTER — APPOINTMENT (RX ONLY)
Dept: URBAN - METROPOLITAN AREA CLINIC 4 | Facility: CLINIC | Age: 64
Setting detail: DERMATOLOGY
End: 2024-06-03

## 2024-06-03 DIAGNOSIS — Z09 ENCOUNTER FOR FOLLOW-UP EXAMINATION AFTER COMPLETED TREATMENT FOR CONDITIONS OTHER THAN MALIGNANT NEOPLASM: ICD-10-CM

## 2024-06-03 PROCEDURE — ? COUNSELING

## 2024-06-03 PROCEDURE — 99212 OFFICE O/P EST SF 10 MIN: CPT

## 2025-08-07 PROBLEM — M25.871 ANKLE IMPINGEMENT SYNDROME, RIGHT: Status: ACTIVE | Noted: 2025-08-07

## 2025-08-12 ENCOUNTER — APPOINTMENT (OUTPATIENT)
Dept: ADMISSIONS | Facility: MEDICAL CENTER | Age: 65
End: 2025-08-12
Attending: ORTHOPAEDIC SURGERY
Payer: COMMERCIAL

## 2025-08-12 VITALS — HEIGHT: 71 IN | BODY MASS INDEX: 34.23 KG/M2

## 2025-08-13 ENCOUNTER — HOSPITAL ENCOUNTER (OUTPATIENT)
Facility: MEDICAL CENTER | Age: 65
End: 2025-08-13
Attending: ORTHOPAEDIC SURGERY | Admitting: ORTHOPAEDIC SURGERY
Payer: COMMERCIAL

## 2025-08-13 ENCOUNTER — ANESTHESIA (OUTPATIENT)
Dept: SURGERY | Facility: MEDICAL CENTER | Age: 65
End: 2025-08-13
Payer: COMMERCIAL

## 2025-08-13 ENCOUNTER — APPOINTMENT (OUTPATIENT)
Dept: RADIOLOGY | Facility: MEDICAL CENTER | Age: 65
End: 2025-08-13
Attending: ORTHOPAEDIC SURGERY
Payer: COMMERCIAL

## 2025-08-13 ENCOUNTER — ANESTHESIA EVENT (OUTPATIENT)
Dept: SURGERY | Facility: MEDICAL CENTER | Age: 65
End: 2025-08-13
Payer: COMMERCIAL

## 2025-08-13 VITALS
SYSTOLIC BLOOD PRESSURE: 128 MMHG | TEMPERATURE: 98.1 F | DIASTOLIC BLOOD PRESSURE: 84 MMHG | WEIGHT: 256.39 LBS | OXYGEN SATURATION: 98 % | HEIGHT: 70 IN | BODY MASS INDEX: 36.71 KG/M2 | RESPIRATION RATE: 17 BRPM | HEART RATE: 69 BPM

## 2025-08-13 DIAGNOSIS — M25.871 ANKLE IMPINGEMENT SYNDROME, RIGHT: ICD-10-CM

## 2025-08-13 LAB — EKG IMPRESSION: NORMAL

## 2025-08-13 PROCEDURE — 160015 HCHG STAT PREOP MINUTES: Performed by: ORTHOPAEDIC SURGERY

## 2025-08-13 PROCEDURE — 160191 HCHG ANESTHESIA STANDARD: Performed by: ORTHOPAEDIC SURGERY

## 2025-08-13 PROCEDURE — 160002 HCHG RECOVERY MINUTES (STAT): Performed by: ORTHOPAEDIC SURGERY

## 2025-08-13 PROCEDURE — 700101 HCHG RX REV CODE 250: Performed by: ORTHOPAEDIC SURGERY

## 2025-08-13 PROCEDURE — 160029 HCHG SURGERY MINUTES - 1ST 30 MINS LEVEL 4: Performed by: ORTHOPAEDIC SURGERY

## 2025-08-13 PROCEDURE — 160041 HCHG SURGERY MINUTES - EA ADDL 1 MIN LEVEL 4: Performed by: ORTHOPAEDIC SURGERY

## 2025-08-13 PROCEDURE — 73600 X-RAY EXAM OF ANKLE: CPT | Mod: RT

## 2025-08-13 PROCEDURE — 27640 PARTIAL REMOVAL OF TIBIA: CPT | Mod: RT | Performed by: ORTHOPAEDIC SURGERY

## 2025-08-13 PROCEDURE — 160194 HCHG PACU STANDARD - EA ADDL 30 MINS: Performed by: ORTHOPAEDIC SURGERY

## 2025-08-13 PROCEDURE — 700111 HCHG RX REV CODE 636 W/ 250 OVERRIDE (IP): Mod: JZ | Performed by: ANESTHESIOLOGY

## 2025-08-13 PROCEDURE — 700111 HCHG RX REV CODE 636 W/ 250 OVERRIDE (IP): Performed by: ORTHOPAEDIC SURGERY

## 2025-08-13 PROCEDURE — 700101 HCHG RX REV CODE 250: Performed by: ANESTHESIOLOGY

## 2025-08-13 PROCEDURE — 700105 HCHG RX REV CODE 258: Performed by: ORTHOPAEDIC SURGERY

## 2025-08-13 PROCEDURE — 160193 HCHG PACU STANDARD - 1ST 60 MINS: Performed by: ORTHOPAEDIC SURGERY

## 2025-08-13 PROCEDURE — 29898 ANKLE ARTHROSCOPY/SURGERY: CPT | Mod: RT | Performed by: ORTHOPAEDIC SURGERY

## 2025-08-13 PROCEDURE — 160025 RECOVERY II MINUTES (STATS): Performed by: ORTHOPAEDIC SURGERY

## 2025-08-13 PROCEDURE — 93005 ELECTROCARDIOGRAM TRACING: CPT | Mod: TC | Performed by: ORTHOPAEDIC SURGERY

## 2025-08-13 PROCEDURE — 160048 HCHG OR STATISTICAL LEVEL 1-5: Performed by: ORTHOPAEDIC SURGERY

## 2025-08-13 PROCEDURE — 160046 HCHG PACU - 1ST 60 MINS PHASE II: Performed by: ORTHOPAEDIC SURGERY

## 2025-08-13 RX ORDER — EPHEDRINE SULFATE 50 MG/ML
5 INJECTION, SOLUTION INTRAVENOUS
Status: DISCONTINUED | OUTPATIENT
Start: 2025-08-13 | End: 2025-08-13 | Stop reason: HOSPADM

## 2025-08-13 RX ORDER — LABETALOL HYDROCHLORIDE 5 MG/ML
5 INJECTION, SOLUTION INTRAVENOUS
Status: DISCONTINUED | OUTPATIENT
Start: 2025-08-13 | End: 2025-08-13 | Stop reason: HOSPADM

## 2025-08-13 RX ORDER — HALOPERIDOL 5 MG/ML
1 INJECTION INTRAMUSCULAR
Status: DISCONTINUED | OUTPATIENT
Start: 2025-08-13 | End: 2025-08-13 | Stop reason: HOSPADM

## 2025-08-13 RX ORDER — OXYCODONE HCL 5 MG/5 ML
5 SOLUTION, ORAL ORAL
Status: DISCONTINUED | OUTPATIENT
Start: 2025-08-13 | End: 2025-08-13 | Stop reason: HOSPADM

## 2025-08-13 RX ORDER — DEXAMETHASONE SODIUM PHOSPHATE 4 MG/ML
INJECTION, SOLUTION INTRA-ARTICULAR; INTRALESIONAL; INTRAMUSCULAR; INTRAVENOUS; SOFT TISSUE PRN
Status: DISCONTINUED | OUTPATIENT
Start: 2025-08-13 | End: 2025-08-13 | Stop reason: SURG

## 2025-08-13 RX ORDER — METOPROLOL TARTRATE 1 MG/ML
1 INJECTION, SOLUTION INTRAVENOUS
Status: DISCONTINUED | OUTPATIENT
Start: 2025-08-13 | End: 2025-08-13 | Stop reason: HOSPADM

## 2025-08-13 RX ORDER — HYDRALAZINE HYDROCHLORIDE 20 MG/ML
5 INJECTION INTRAMUSCULAR; INTRAVENOUS
Status: DISCONTINUED | OUTPATIENT
Start: 2025-08-13 | End: 2025-08-13 | Stop reason: HOSPADM

## 2025-08-13 RX ORDER — HYDROMORPHONE HYDROCHLORIDE 1 MG/ML
0.1 INJECTION, SOLUTION INTRAMUSCULAR; INTRAVENOUS; SUBCUTANEOUS
Status: DISCONTINUED | OUTPATIENT
Start: 2025-08-13 | End: 2025-08-13 | Stop reason: HOSPADM

## 2025-08-13 RX ORDER — BUPIVACAINE HYDROCHLORIDE 2.5 MG/ML
INJECTION, SOLUTION EPIDURAL; INFILTRATION; INTRACAUDAL; PERINEURAL
Status: DISCONTINUED | OUTPATIENT
Start: 2025-08-13 | End: 2025-08-13 | Stop reason: HOSPADM

## 2025-08-13 RX ORDER — HYDROMORPHONE HYDROCHLORIDE 1 MG/ML
0.4 INJECTION, SOLUTION INTRAMUSCULAR; INTRAVENOUS; SUBCUTANEOUS
Status: DISCONTINUED | OUTPATIENT
Start: 2025-08-13 | End: 2025-08-13 | Stop reason: HOSPADM

## 2025-08-13 RX ORDER — ONDANSETRON 2 MG/ML
4 INJECTION INTRAMUSCULAR; INTRAVENOUS
Status: DISCONTINUED | OUTPATIENT
Start: 2025-08-13 | End: 2025-08-13 | Stop reason: HOSPADM

## 2025-08-13 RX ORDER — SODIUM CHLORIDE, SODIUM LACTATE, POTASSIUM CHLORIDE, CALCIUM CHLORIDE 600; 310; 30; 20 MG/100ML; MG/100ML; MG/100ML; MG/100ML
INJECTION, SOLUTION INTRAVENOUS CONTINUOUS
Status: ACTIVE | OUTPATIENT
Start: 2025-08-13 | End: 2025-08-13

## 2025-08-13 RX ORDER — SODIUM CHLORIDE, SODIUM LACTATE, POTASSIUM CHLORIDE, CALCIUM CHLORIDE 600; 310; 30; 20 MG/100ML; MG/100ML; MG/100ML; MG/100ML
INJECTION, SOLUTION INTRAVENOUS CONTINUOUS
Status: DISCONTINUED | OUTPATIENT
Start: 2025-08-13 | End: 2025-08-13 | Stop reason: HOSPADM

## 2025-08-13 RX ORDER — METOCLOPRAMIDE HYDROCHLORIDE 5 MG/ML
INJECTION INTRAMUSCULAR; INTRAVENOUS PRN
Status: DISCONTINUED | OUTPATIENT
Start: 2025-08-13 | End: 2025-08-13 | Stop reason: SURG

## 2025-08-13 RX ORDER — ALBUTEROL SULFATE 5 MG/ML
2.5 SOLUTION RESPIRATORY (INHALATION)
Status: DISCONTINUED | OUTPATIENT
Start: 2025-08-13 | End: 2025-08-13 | Stop reason: HOSPADM

## 2025-08-13 RX ORDER — MEPERIDINE HYDROCHLORIDE 25 MG/ML
6.25 INJECTION INTRAMUSCULAR; INTRAVENOUS; SUBCUTANEOUS
Status: DISCONTINUED | OUTPATIENT
Start: 2025-08-13 | End: 2025-08-13 | Stop reason: HOSPADM

## 2025-08-13 RX ORDER — ONDANSETRON 2 MG/ML
INJECTION INTRAMUSCULAR; INTRAVENOUS PRN
Status: DISCONTINUED | OUTPATIENT
Start: 2025-08-13 | End: 2025-08-13 | Stop reason: SURG

## 2025-08-13 RX ORDER — CEFAZOLIN SODIUM 1 G/3ML
2 INJECTION, POWDER, FOR SOLUTION INTRAMUSCULAR; INTRAVENOUS ONCE
Status: COMPLETED | OUTPATIENT
Start: 2025-08-13 | End: 2025-08-13

## 2025-08-13 RX ORDER — HYDROMORPHONE HYDROCHLORIDE 1 MG/ML
0.2 INJECTION, SOLUTION INTRAMUSCULAR; INTRAVENOUS; SUBCUTANEOUS
Status: DISCONTINUED | OUTPATIENT
Start: 2025-08-13 | End: 2025-08-13 | Stop reason: HOSPADM

## 2025-08-13 RX ORDER — DIPHENHYDRAMINE HYDROCHLORIDE 50 MG/ML
12.5 INJECTION, SOLUTION INTRAMUSCULAR; INTRAVENOUS
Status: DISCONTINUED | OUTPATIENT
Start: 2025-08-13 | End: 2025-08-13 | Stop reason: HOSPADM

## 2025-08-13 RX ORDER — OXYCODONE HCL 5 MG/5 ML
10 SOLUTION, ORAL ORAL
Status: DISCONTINUED | OUTPATIENT
Start: 2025-08-13 | End: 2025-08-13 | Stop reason: HOSPADM

## 2025-08-13 RX ADMIN — LIDOCAINE HYDROCHLORIDE 0.5 ML: 10 INJECTION, SOLUTION EPIDURAL; INFILTRATION; INTRACAUDAL at 12:31

## 2025-08-13 RX ADMIN — CEFAZOLIN 2 G: 1 INJECTION, POWDER, FOR SOLUTION INTRAMUSCULAR; INTRAVENOUS at 13:02

## 2025-08-13 RX ADMIN — DEXAMETHASONE SODIUM PHOSPHATE 8 MG: 4 INJECTION INTRA-ARTICULAR; INTRALESIONAL; INTRAMUSCULAR; INTRAVENOUS; SOFT TISSUE at 13:02

## 2025-08-13 RX ADMIN — ALBUTEROL SULFATE 2.5 MG: 2.5 SOLUTION RESPIRATORY (INHALATION) at 14:59

## 2025-08-13 RX ADMIN — PROPOFOL 200 MG: 10 INJECTION, EMULSION INTRAVENOUS at 13:02

## 2025-08-13 RX ADMIN — FENTANYL CITRATE 100 MCG: 50 INJECTION, SOLUTION INTRAMUSCULAR; INTRAVENOUS at 13:02

## 2025-08-13 RX ADMIN — SODIUM CHLORIDE, POTASSIUM CHLORIDE, SODIUM LACTATE AND CALCIUM CHLORIDE: 600; 310; 30; 20 INJECTION, SOLUTION INTRAVENOUS at 12:31

## 2025-08-13 RX ADMIN — METOCLOPRAMIDE HYDROCHLORIDE 10 MG: 5 INJECTION INTRAMUSCULAR; INTRAVENOUS at 13:02

## 2025-08-13 RX ADMIN — ONDANSETRON 4 MG: 2 INJECTION INTRAMUSCULAR; INTRAVENOUS at 13:02

## 2025-08-13 ASSESSMENT — PAIN DESCRIPTION - PAIN TYPE
TYPE: SURGICAL PAIN
TYPE: ACUTE PAIN
TYPE: SURGICAL PAIN

## (undated) DEVICE — CHLORAPREP 26 ML APPLICATOR - ORANGE TINT(25/CA)

## (undated) DEVICE — SODIUM CHL IRRIGATION 0.9% 1000ML (12EA/CA)

## (undated) DEVICE — SUTURE 0 VICRYL PLUS CT-1 - 36 INCH (36/BX)

## (undated) DEVICE — HUMID-VENT HEAT AND MOISTURE EXCHANGE- (50/BX)

## (undated) DEVICE — GLOVE BIOGEL PI ORTHO SZ 7.5 PF LF (40PR/BX)

## (undated) DEVICE — ELECTRODE 850 FOAM ADHESIVE - HYDROGEL RADIOTRNSPRNT (50/PK)

## (undated) DEVICE — BAG SPONGE COUNT 10.25 X 32 - BLUE (250/CA)

## (undated) DEVICE — SUTURE 4-0 MONOCRYL PLUS PS-2 - 27 INCH (36/BX)

## (undated) DEVICE — DRAPE LARGE 3 QUARTER - (20/CA)

## (undated) DEVICE — GLOVE BIOGEL PI INDICATOR SZ 9.0 SURGICAL PF LF -(50PR/BX 4BX/CA)

## (undated) DEVICE — CANNULA KIT UNIV GREY - (10/BX)

## (undated) DEVICE — DRAPE U SPLIT IMP 54 X 76 - (24/CA)

## (undated) DEVICE — SODIUM CHL. IRRIGATION 0.9% 3000ML (4EA/CA 65CA/PF)

## (undated) DEVICE — LENS/HOOD FOR SPACESUIT - (32/PK) PEEL AWAY FACE

## (undated) DEVICE — HANDPIECE 10FT INTPLS SCT PLS IRRIGATION HAND CONTROL SET (6/PK)

## (undated) DEVICE — GLOVE BIOGEL PI INDICATOR SZ 8.0 SURGICAL PF LF -(50/BX 4BX/CA)

## (undated) DEVICE — CONTAINER SPECIMEN BAG OR - STERILE 4 OZ W/LID (100EA/CA)

## (undated) DEVICE — GLOVE SZ 7.5 LF PROTEXIS (50PR/BX)

## (undated) DEVICE — SYRINGE ASEPTO - (50EA/CA

## (undated) DEVICE — GLOVE BIOGEL PI ULTRATOUCH SZ 7.0 SURGICAL PF LF- POWDER FREE (50/BX 4BX/CA)

## (undated) DEVICE — GLOVE PROTEXIS LATEX SIZE 9 (40PR/BX)

## (undated) DEVICE — ELECTRODE DUAL RETURN W/ CORD - (50/PK)

## (undated) DEVICE — NEEDLE SPINAL NON-SAFETY 18 GA X 3 IN (25EA/BX)

## (undated) DEVICE — GOWN WARMING STANDARD FLEX - (30/CA)

## (undated) DEVICE — PACK SHOULDER ARTHROSCOPY SM - (2EA/CA)

## (undated) DEVICE — SUCTION INSTRUMENT YANKAUER BULBOUS TIP W/O VENT (50EA/CA)

## (undated) DEVICE — TOWELS CLOTH SURGICAL - (4/PK 20PK/CA)

## (undated) DEVICE — PENCIL ELECTSURG 10FT BTN SWH - (50/CA)

## (undated) DEVICE — GOWN SURGICAL X-LARGE ULTRA - FILM-REINFORCED (20/CA)

## (undated) DEVICE — DRAPETIBURON SHOULDER W/POUCH - (5EA/CA)

## (undated) DEVICE — BLADE SURGICAL #15 - (50/BX 3BX/CA)

## (undated) DEVICE — SUTURE 2-0 MONOCRYL PLUS UNDYED CT-1 1 X 36 (36EA/BX)"

## (undated) DEVICE — BOVIE NEEDLE TIP INSULATD NON-SAFETY 2CM (50/PK)

## (undated) DEVICE — CLOSURE SKIN STRIP 1/2 X 4 IN - (STERI STRIP) (50/BX 4BX/CA)

## (undated) DEVICE — SENSOR SPO2 NEO LNCS ADHESIVE (20/BX) SEE USER NOTES

## (undated) DEVICE — GLOVE BIOGEL PI ULTRATOUCH SZ 7.5 SURGICAL PF LF -(50/BX 4BX/CA)

## (undated) DEVICE — MASK ANESTHESIA ADULT  - (100/CA)

## (undated) DEVICE — GLOVE BIOGEL PI ULTRATOUCH SZ 8.0 SURGICAL PF LF - (50/BX 4BX/CA)

## (undated) DEVICE — STERI STRIP COMPOUND BENZOIN - TINCTURE 0.6ML WITH APPLICATOR (40EA/BX)

## (undated) DEVICE — SHAVER, 5.0 RESECTOR

## (undated) DEVICE — DRILL BIT

## (undated) DEVICE — KIT ANESTHESIA W/CIRCUIT & 3/LT BAG W/FILTER (20EA/CA)

## (undated) DEVICE — PACK TOTAL HIP - (1/CA)

## (undated) DEVICE — CANISTER SUCTION RIGID RED 1500CC (40EA/CA)

## (undated) DEVICE — TUBING CASSETTE CROSSFLOW INTEGRATED (1/EA) ORDER MULTIPLES OF 10

## (undated) DEVICE — PAD UNIVERSAL MULTI USE (1/EA)

## (undated) DEVICE — PROTECTOR ULNA NERVE - (36PR/CA)

## (undated) DEVICE — GLOVE BIOGEL PI INDICATOR SZ 7.0 SURGICAL PF LF - (50/BX 4BX/CA)

## (undated) DEVICE — SUTURE 0 PDS-2 CTX 36 INCH - (24/BX)

## (undated) DEVICE — BANDAGE ELASTIC STERILE VELCRO 6 X 5 YDS (25EA/CA)

## (undated) DEVICE — NEPTUNE 4 PORT MANIFOLD - (20/PK)

## (undated) DEVICE — DRAPE LOWER EXTREMETY - (6/CA)

## (undated) DEVICE — SUTURE 4-0 ETHILON FS-2 18 (36PK/BX)"

## (undated) DEVICE — GLOVE BIOGEL SZ 8 SURGICAL PF LTX - (50PR/BX 4BX/CA)

## (undated) DEVICE — TUBE CONNECTING SUCTION - CLEAR PLASTIC STERILE 72 IN (50EA/CA)

## (undated) DEVICE — DRESSING INTEGUSEAL MICROBIAL SEALANT IS100 (20EA/CA)

## (undated) DEVICE — SUCTION TIP STRAIGHT ARGYLE - 50EA/CA

## (undated) DEVICE — STAPLER SKIN DISP - (6/BX 10BX/CA) VISISTAT

## (undated) DEVICE — GLOVE BIOGEL PI ORTHO SZ 9 PF LF

## (undated) DEVICE — COVER LIGHT HANDLE FLEXIBLE - SOFT (2EA/PK 80PK/CA)

## (undated) DEVICE — GLOVE, LITE (PAIR)

## (undated) DEVICE — GLOVE BIOGEL PI INDICATOR SZ 7.5 SURGICAL PF LF -(50/BX 4BX/CA)

## (undated) DEVICE — SUTURE GENERAL

## (undated) DEVICE — DRESSING XEROFORM 1X8 - (50/BX 4BX/CA)

## (undated) DEVICE — HEAD HOLDER JUNIOR/ADULT

## (undated) DEVICE — TIP INTPLS HFLO ML ORFC BTRY - (12/CS)  FOR SURGILAV

## (undated) DEVICE — FIBERWIRE 2.0 (12EA/BX)

## (undated) DEVICE — LACTATED RINGERS INJ 1000 ML - (14EA/CA 60CA/PF)

## (undated) DEVICE — SHAVER4.0 AGGRESSIVE + FORMLA (5EA/BX)

## (undated) DEVICE — WATER IRRIGATION STERILE 1000ML (12EA/CA)

## (undated) DEVICE — GLOVE BIOGEL INDICATOR SZ 7.5 SURGICAL PF LTX - (50PR/BX 4BX/CA)

## (undated) DEVICE — DRESSING TEGADERM 8 X 12 - (10/BX 8BX/CA)

## (undated) DEVICE — SUTURE 1 PDS-2 PLUS CTX - (24/BX)

## (undated) DEVICE — SUTURE 2 TICRON BLUE GS-21 (36EA/CA)

## (undated) DEVICE — MASK AIRWAY SIZE 4 UNIQUE SILICON (10EA/BX)

## (undated) DEVICE — DRESSING AQUACEL AG ADVANTAGE 3.5 X 10" (10EA/BX)"

## (undated) DEVICE — PILLOW ABDUCTION HIP MEDIUM - (6EA/CA)

## (undated) DEVICE — TUBING PATIENT W/CONNECTOR REDEUCE (1EA)

## (undated) DEVICE — GLOVE BIOGEL PI INDICATOR SZ 8.5 SURGICAL PF LF - (50PR/BX 4BX/CA)

## (undated) DEVICE — DRESSING 3X3 ADAPTIC GAUZE - (50EA/CT)

## (undated) DEVICE — DRAPE SURGICAL U 77X120 - (10/CA)

## (undated) DEVICE — PEN SKIN MARKER W/RULER - (50EA/BX)

## (undated) DEVICE — PADDING CAST 6 IN STERILE - 6 X 4 YDS (24/CA)

## (undated) DEVICE — SUTURE 3-0 MONOCRYL PLUS PS-1 - 27 INCH (36/BX)

## (undated) DEVICE — GLOVE PROTEXIS LATEX MICRO SIZE 9 (50PR/BX)

## (undated) DEVICE — TOWEL STOP TIMEOUT SAFETY FLAG (40EA/CA)

## (undated) DEVICE — BURR ROUND 66MM

## (undated) DEVICE — Device

## (undated) DEVICE — BLADE SAGITTAL SAW DUAL CUT 75.0 X 25.0MM (1/EA)

## (undated) DEVICE — BLADE SAGITTAL SAW 9.4MM X 25.5MM X .4MM FINE TOOTH (1/EA)

## (undated) DEVICE — TUBE E-T HI-LO CUFF 7.5MM (10EA/PK)

## (undated) DEVICE — PAD LAP STERILE 18 X 18 - (5/PK 40PK/CA)

## (undated) DEVICE — SPIDER SHOULDER HOLDER (12EA/BX)

## (undated) DEVICE — FOAM FACEHOLDER SPIDER (8EA/BX)

## (undated) DEVICE — NEEDLE DAVIS TONSIL 1/2 CIR - (2EA/PK20PK/BX)

## (undated) DEVICE — ARTHROWAND TURBOVAC 3.5/90 SCT

## (undated) DEVICE — SLEEVE VASO DVT COMPRESSION CALF MED - (10PR/CA)

## (undated) DEVICE — SYRINGE DISP. 60 CC LL - (30/BX, 12BX/CA)**WHEN THESE ARE GONE ORDER #500206**

## (undated) DEVICE — SYRINGE SAFETY 10 ML 18 GA X 1 1/2 BLUNT LL (100/BX 4BX/CA)

## (undated) DEVICE — PAD PREP 24 X 48 CUFFED - (100/CA)